# Patient Record
Sex: FEMALE | Race: OTHER | Employment: FULL TIME | URBAN - METROPOLITAN AREA
[De-identification: names, ages, dates, MRNs, and addresses within clinical notes are randomized per-mention and may not be internally consistent; named-entity substitution may affect disease eponyms.]

---

## 2017-04-04 ENCOUNTER — OFFICE VISIT (OUTPATIENT)
Dept: FAMILY MEDICINE CLINIC | Age: 42
End: 2017-04-04

## 2017-04-04 VITALS
HEART RATE: 84 BPM | BODY MASS INDEX: 30.15 KG/M2 | HEIGHT: 64 IN | RESPIRATION RATE: 16 BRPM | WEIGHT: 176.6 LBS | TEMPERATURE: 97.9 F | OXYGEN SATURATION: 99 % | DIASTOLIC BLOOD PRESSURE: 80 MMHG | SYSTOLIC BLOOD PRESSURE: 124 MMHG

## 2017-04-04 DIAGNOSIS — L23.5 ALLERGIC DERMATITIS DUE TO OTHER CHEMICAL PRODUCT: Primary | ICD-10-CM

## 2017-04-04 PROCEDURE — 99213 OFFICE O/P EST LOW 20 MIN: CPT | Performed by: NURSE PRACTITIONER

## 2017-04-04 RX ORDER — METHYLPREDNISOLONE 4 MG/1
TABLET ORAL
Qty: 1 KIT | Refills: 0 | Status: SHIPPED | OUTPATIENT
Start: 2017-04-04 | End: 2017-04-10

## 2017-04-04 ASSESSMENT — ENCOUNTER SYMPTOMS
FACIAL SWELLING: 0
COUGH: 0
WHEEZING: 0
RHINORRHEA: 0
SHORTNESS OF BREATH: 0
SORE THROAT: 0
VOICE CHANGE: 0
ABDOMINAL DISTENTION: 0
NAUSEA: 0
TROUBLE SWALLOWING: 0
VOMITING: 0

## 2017-07-14 RX ORDER — NORGESTIMATE AND ETHINYL ESTRADIOL 7DAYSX3 28
KIT ORAL
Qty: 84 TABLET | Refills: 1 | Status: SHIPPED | OUTPATIENT
Start: 2017-07-14 | End: 2017-12-19 | Stop reason: SDUPTHER

## 2017-09-13 ENCOUNTER — OFFICE VISIT (OUTPATIENT)
Dept: FAMILY MEDICINE CLINIC | Age: 42
End: 2017-09-13

## 2017-09-13 VITALS
HEART RATE: 96 BPM | HEIGHT: 64 IN | SYSTOLIC BLOOD PRESSURE: 136 MMHG | TEMPERATURE: 98.1 F | WEIGHT: 186 LBS | OXYGEN SATURATION: 99 % | DIASTOLIC BLOOD PRESSURE: 86 MMHG | BODY MASS INDEX: 31.76 KG/M2

## 2017-09-13 DIAGNOSIS — R35.0 FREQUENCY OF URINATION: ICD-10-CM

## 2017-09-13 DIAGNOSIS — R35.0 FREQUENCY OF URINATION: Primary | ICD-10-CM

## 2017-09-13 LAB
BILIRUBIN, POC: NORMAL
BLOOD URINE, POC: NORMAL
CLARITY, POC: CLEAR
COLOR, POC: YELLOW
GLUCOSE URINE, POC: NORMAL
KETONES, POC: NORMAL
LEUKOCYTE EST, POC: NORMAL
NITRITE, POC: NORMAL
PH, POC: 6
PROTEIN, POC: NORMAL
SPECIFIC GRAVITY, POC: 1030
UROBILINOGEN, POC: 3.5

## 2017-09-13 PROCEDURE — 81003 URINALYSIS AUTO W/O SCOPE: CPT | Performed by: NURSE PRACTITIONER

## 2017-09-13 PROCEDURE — 99212 OFFICE O/P EST SF 10 MIN: CPT | Performed by: NURSE PRACTITIONER

## 2017-09-13 ASSESSMENT — PATIENT HEALTH QUESTIONNAIRE - PHQ9
SUM OF ALL RESPONSES TO PHQ9 QUESTIONS 1 & 2: 0
2. FEELING DOWN, DEPRESSED OR HOPELESS: 0
1. LITTLE INTEREST OR PLEASURE IN DOING THINGS: 0
SUM OF ALL RESPONSES TO PHQ QUESTIONS 1-9: 0

## 2017-09-15 LAB
ORGANISM: ABNORMAL
URINE CULTURE, ROUTINE: ABNORMAL

## 2017-09-18 RX ORDER — FLUCONAZOLE 150 MG/1
150 TABLET ORAL ONCE
Qty: 1 TABLET | Refills: 0 | Status: SHIPPED | OUTPATIENT
Start: 2017-09-18 | End: 2017-09-18

## 2017-09-18 RX ORDER — CIPROFLOXACIN 500 MG/1
500 TABLET, FILM COATED ORAL 2 TIMES DAILY
Qty: 10 TABLET | Refills: 0 | Status: SHIPPED | OUTPATIENT
Start: 2017-09-18 | End: 2017-09-23

## 2017-10-24 DIAGNOSIS — Z12.4 PAP SMEAR FOR CERVICAL CANCER SCREENING: ICD-10-CM

## 2017-10-24 DIAGNOSIS — Z00.00 WELLNESS EXAMINATION: ICD-10-CM

## 2017-10-24 DIAGNOSIS — Z11.3 ROUTINE SCREENING FOR STI (SEXUALLY TRANSMITTED INFECTION): ICD-10-CM

## 2017-10-24 PROBLEM — B00.9 HSV (HERPES SIMPLEX VIRUS) INFECTION: Status: ACTIVE | Noted: 2017-10-24

## 2017-10-24 LAB
BILIRUBIN URINE: NEGATIVE
BLOOD, URINE: NEGATIVE
CLARITY: CLEAR
COLOR: YELLOW
GLUCOSE URINE: NEGATIVE MG/DL
KETONES, URINE: NEGATIVE MG/DL
LEUKOCYTE ESTERASE, URINE: NEGATIVE
NITRITE, URINE: NEGATIVE
PH UA: 7 (ref 5–9)
PROTEIN UA: NEGATIVE MG/DL
SPECIFIC GRAVITY UA: 1.02 (ref 1–1.03)
UROBILINOGEN, URINE: 0.2 E.U./DL

## 2017-10-27 ENCOUNTER — TELEPHONE (OUTPATIENT)
Dept: FAMILY MEDICINE CLINIC | Age: 42
End: 2017-10-27

## 2017-10-27 ENCOUNTER — OFFICE VISIT (OUTPATIENT)
Dept: FAMILY MEDICINE CLINIC | Age: 42
End: 2017-10-27

## 2017-10-27 VITALS
SYSTOLIC BLOOD PRESSURE: 122 MMHG | TEMPERATURE: 98.5 F | HEART RATE: 82 BPM | WEIGHT: 177 LBS | RESPIRATION RATE: 14 BRPM | DIASTOLIC BLOOD PRESSURE: 80 MMHG | BODY MASS INDEX: 30.38 KG/M2

## 2017-10-27 DIAGNOSIS — E53.8 B12 DEFICIENCY: ICD-10-CM

## 2017-10-27 DIAGNOSIS — Z13.1 SCREENING FOR DIABETES MELLITUS: ICD-10-CM

## 2017-10-27 DIAGNOSIS — Z00.00 WELLNESS EXAMINATION: ICD-10-CM

## 2017-10-27 DIAGNOSIS — E66.09 OBESITY DUE TO EXCESS CALORIES, UNSPECIFIED CLASSIFICATION, UNSPECIFIED WHETHER SERIOUS COMORBIDITY PRESENT: Primary | ICD-10-CM

## 2017-10-27 DIAGNOSIS — E55.9 VITAMIN D DEFICIENCY: Primary | ICD-10-CM

## 2017-10-27 DIAGNOSIS — E87.5 HYPERKALEMIA: ICD-10-CM

## 2017-10-27 DIAGNOSIS — Z13.29 SCREENING FOR THYROID DISORDER: ICD-10-CM

## 2017-10-27 DIAGNOSIS — Z13.220 SCREENING, LIPID: ICD-10-CM

## 2017-10-27 LAB
ALBUMIN SERPL-MCNC: 4 G/DL (ref 3.9–4.9)
ALP BLD-CCNC: 48 U/L (ref 40–130)
ALT SERPL-CCNC: 17 U/L (ref 0–33)
ANION GAP SERPL CALCULATED.3IONS-SCNC: 14 MEQ/L (ref 7–13)
AST SERPL-CCNC: 19 U/L (ref 0–35)
BILIRUB SERPL-MCNC: 0.3 MG/DL (ref 0–1.2)
BUN BLDV-MCNC: 21 MG/DL (ref 6–20)
C. TRACHOMATIS DNA ,URINE: NEGATIVE
CALCIUM SERPL-MCNC: 9.2 MG/DL (ref 8.6–10.2)
CHLORIDE BLD-SCNC: 104 MEQ/L (ref 98–107)
CHOLESTEROL, TOTAL: 187 MG/DL (ref 0–199)
CO2: 22 MEQ/L (ref 22–29)
CREAT SERPL-MCNC: 0.73 MG/DL (ref 0.5–0.9)
GENITAL CULTURE, ROUTINE: NORMAL
GFR AFRICAN AMERICAN: >60
GFR NON-AFRICAN AMERICAN: >60
GLOBULIN: 3.2 G/DL (ref 2.3–3.5)
GLUCOSE BLD-MCNC: 81 MG/DL (ref 74–109)
HBA1C MFR BLD: 5.5 % (ref 4.8–5.9)
HCT VFR BLD CALC: 35.5 % (ref 37–47)
HDLC SERPL-MCNC: 86 MG/DL (ref 40–59)
HEMOGLOBIN: 10.8 G/DL (ref 12–16)
LDL CHOLESTEROL CALCULATED: 86 MG/DL (ref 0–129)
MCH RBC QN AUTO: 21.4 PG (ref 27–31.3)
MCHC RBC AUTO-ENTMCNC: 30.4 % (ref 33–37)
MCV RBC AUTO: 70.3 FL (ref 82–100)
N. GONORRHOEAE DNA, URINE: NEGATIVE
PDW BLD-RTO: 18.7 % (ref 11.5–14.5)
PLATELET # BLD: 287 K/UL (ref 130–400)
POTASSIUM SERPL-SCNC: 5.3 MEQ/L (ref 3.5–5.1)
RBC # BLD: 5.05 M/UL (ref 4.2–5.4)
SODIUM BLD-SCNC: 140 MEQ/L (ref 132–144)
SPECIMEN SOURCE: NORMAL
T. VAGINALIS AMPLIFIED: NEGATIVE
T4 TOTAL: 7.1 UG/DL (ref 4.5–11.7)
TOTAL PROTEIN: 7.2 G/DL (ref 6.4–8.1)
TRIGL SERPL-MCNC: 73 MG/DL (ref 0–200)
TSH REFLEX: 2.72 UIU/ML (ref 0.27–4.2)
VITAMIN B-12: 912 PG/ML (ref 211–946)
VITAMIN D 25-HYDROXY: 19.9 NG/ML (ref 30–100)
WBC # BLD: 5.3 K/UL (ref 4.8–10.8)

## 2017-10-27 PROCEDURE — 99213 OFFICE O/P EST LOW 20 MIN: CPT | Performed by: INTERNAL MEDICINE

## 2017-10-27 RX ORDER — PHENTERMINE HYDROCHLORIDE 37.5 MG/1
37.5 TABLET ORAL
Qty: 60 TABLET | Refills: 0 | Status: SHIPPED | OUTPATIENT
Start: 2017-10-27 | End: 2017-10-27 | Stop reason: SDUPTHER

## 2017-10-27 RX ORDER — PHENTERMINE HYDROCHLORIDE 37.5 MG/1
37.5 TABLET ORAL
Qty: 60 TABLET | Refills: 0 | Status: SHIPPED | OUTPATIENT
Start: 2017-10-27 | End: 2017-10-27 | Stop reason: DRUGHIGH

## 2017-10-27 RX ORDER — ERGOCALCIFEROL 1.25 MG/1
50000 CAPSULE ORAL WEEKLY
Qty: 8 CAPSULE | Refills: 0 | Status: SHIPPED | OUTPATIENT
Start: 2017-10-27 | End: 2017-11-28 | Stop reason: SDUPTHER

## 2017-10-27 RX ORDER — PHENTERMINE HYDROCHLORIDE 37.5 MG/1
37.5 TABLET ORAL
Qty: 30 TABLET | Refills: 0 | Status: SHIPPED
Start: 2017-10-27 | End: 2017-11-28 | Stop reason: SDUPTHER

## 2017-10-27 ASSESSMENT — ENCOUNTER SYMPTOMS
EYE DISCHARGE: 0
CONSTIPATION: 0
WHEEZING: 0
EYE PAIN: 0
SORE THROAT: 0
BACK PAIN: 0
EYE ITCHING: 0
COLOR CHANGE: 0
COUGH: 0
TROUBLE SWALLOWING: 0
EYE REDNESS: 0
VOICE CHANGE: 0
NAUSEA: 0
BLOOD IN STOOL: 0
ABDOMINAL DISTENTION: 0
DIARRHEA: 0
SHORTNESS OF BREATH: 0
PHOTOPHOBIA: 0
ABDOMINAL PAIN: 0

## 2017-10-27 NOTE — PROGRESS NOTES
Subjective:      Patient ID: Clark Garcia is a 38 y.o. female    HPI    Presents for weight loss counseling. Previous attempts to lose weight have included exercise, dietary modification and use of Adipex. Since 2011, she has lost 38lbs, however, patient attests to more weight loss than documented in our system. Last use of Adipex was 'many years ago\"-not sure exactly how many. She has remained steadfast with diet and exercise but weight is currently at a plateau. As such she requests another pharmacological adjuvant to current weight loss methods. No history of tobacco smoking, no chest pain or SOB, no fever or chills, no cough, nausea, vomiting. No headache, dizziness or lightheadedness. 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 No pertinent surgical history. Social History     Social History    Marital status:      Spouse name: N/A    Number of children: N/A    Years of education: N/A     Occupational History    Not on file. Social History Main Topics    Smoking status: Never Smoker    Smokeless tobacco: Never Used    Alcohol use 0.0 oz/week    Drug use: No    Sexual activity: Not on file     Other Topics Concern    Not on file     Social History Narrative    No narrative on file     History reviewed. No pertinent family history. Allergies:  Formaldehyde; Mushroom extract complex;  Shellfish-derived products; and Vicodin [hydrocodone-acetaminophen]  Patient Active Problem List   Diagnosis    Asthma    Anemia    Migraines    B12 deficiency    BMI 30.0-30.9,adult    HSV (herpes simplex virus) infection     Current Outpatient Prescriptions on File Prior to Visit   Medication Sig Dispense Refill    valACYclovir (VALTREX) 500 MG tablet Take 1 tablet by mouth 2 times daily for 5 days 90 tablet 1    Norgestim-Eth Estrad Triphasic (TRI-PREVIFEM) 0.18/0.215/0.25 MG-35 MCG TABS Take 1 tablet by mouth daily 84 tablet 1    mometasone (ELOCON) 0.1 % cream Apply topically daily. 15 g 1    albuterol (PROVENTIL HFA;VENTOLIN HFA) 108 (90 BASE) MCG/ACT inhaler Inhale 2 puffs into the lungs every 4 hours as needed. 1 Inhaler 3    SUMAtriptan (IMITREX) 50 MG tablet Take 1 tablet by mouth once as needed for Migraine 9 tablet 3     No current facility-administered medications on file prior to visit. Review of Systems   Constitutional: Negative for activity change, appetite change, chills, diaphoresis, fatigue, fever and unexpected weight change. HENT: Negative for congestion, dental problem, drooling, ear discharge, ear pain, hearing loss, mouth sores, sore throat, trouble swallowing and voice change. Eyes: Negative for photophobia, pain, discharge, redness and itching. Respiratory: Negative for cough, shortness of breath and wheezing. Gastrointestinal: Negative for abdominal distention, abdominal pain, blood in stool, constipation, diarrhea and nausea. Endocrine: Negative for cold intolerance, heat intolerance, polydipsia and polyuria. Genitourinary: Negative for decreased urine volume, difficulty urinating, dysuria, flank pain, frequency, hematuria and urgency. Musculoskeletal: Negative for arthralgias, back pain and joint swelling. Skin: Negative for color change. Allergic/Immunologic: Negative for environmental allergies and food allergies. Neurological: Negative for dizziness, seizures, syncope, weakness, light-headedness, numbness and headaches. Psychiatric/Behavioral: Negative for agitation, decreased concentration, dysphoric mood and hallucinations. The patient is not nervous/anxious.         Objective:   /80 (Site: Left Arm, Position: Sitting, Cuff Size: Medium Adult)   Pulse 82   Temp 98.5 °F (36.9 °C) (Temporal)   Resp 14   Wt 177 lb (80.3 kg)   LMP 10/19/2017 (Exact Date)   BMI 30.38 kg/m²     Physical Exam   Constitutional: She is oriented to person,

## 2017-11-28 ENCOUNTER — OFFICE VISIT (OUTPATIENT)
Dept: FAMILY MEDICINE CLINIC | Age: 42
End: 2017-11-28

## 2017-11-28 VITALS
BODY MASS INDEX: 29.02 KG/M2 | DIASTOLIC BLOOD PRESSURE: 76 MMHG | TEMPERATURE: 98.1 F | RESPIRATION RATE: 16 BRPM | WEIGHT: 170 LBS | SYSTOLIC BLOOD PRESSURE: 118 MMHG | HEART RATE: 74 BPM | HEIGHT: 64 IN

## 2017-11-28 DIAGNOSIS — E66.09 OBESITY DUE TO EXCESS CALORIES, UNSPECIFIED CLASSIFICATION, UNSPECIFIED WHETHER SERIOUS COMORBIDITY PRESENT: ICD-10-CM

## 2017-11-28 DIAGNOSIS — E55.9 VITAMIN D DEFICIENCY: ICD-10-CM

## 2017-11-28 PROCEDURE — 99213 OFFICE O/P EST LOW 20 MIN: CPT | Performed by: INTERNAL MEDICINE

## 2017-11-28 RX ORDER — ERGOCALCIFEROL 1.25 MG/1
50000 CAPSULE ORAL WEEKLY
Qty: 4 CAPSULE | Refills: 0 | Status: SHIPPED | OUTPATIENT
Start: 2017-11-28 | End: 2017-12-20 | Stop reason: DRUGHIGH

## 2017-11-28 RX ORDER — PHENTERMINE HYDROCHLORIDE 37.5 MG/1
37.5 TABLET ORAL
Qty: 30 TABLET | Refills: 0 | Status: SHIPPED | OUTPATIENT
Start: 2017-11-28 | End: 2017-12-19 | Stop reason: SDUPTHER

## 2017-11-28 ASSESSMENT — ENCOUNTER SYMPTOMS
VOICE CHANGE: 0
EYE ITCHING: 0
DIARRHEA: 0
EYE REDNESS: 0
ABDOMINAL DISTENTION: 0
EYE DISCHARGE: 0
EYE PAIN: 0
TROUBLE SWALLOWING: 0
NAUSEA: 0
COLOR CHANGE: 0
SHORTNESS OF BREATH: 0
COUGH: 0
PHOTOPHOBIA: 0
CONSTIPATION: 0
BLOOD IN STOOL: 0
ABDOMINAL PAIN: 0
WHEEZING: 0
BACK PAIN: 0
SORE THROAT: 0

## 2017-11-28 NOTE — PROGRESS NOTES
Subjective:      Patient ID: Jailyn Lozoya is a 38 y.o. female    HPI     Presents for refill of Adipex weight loss medication. Filled 1 month ago and patient has lost 7 pounds since then. Denies chest pain or SOB, no fever or chills. Was also started on ergocalcoferol 1 month ago for Viit. D deficiency. Has had only 4 tabs. Needs more before rechecking Vit D. 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 No pertinent surgical history. Social History     Social History    Marital status:      Spouse name: N/A    Number of children: N/A    Years of education: N/A     Occupational History    Not on file. Social History Main Topics    Smoking status: Never Smoker    Smokeless tobacco: Never Used    Alcohol use 0.0 oz/week    Drug use: No    Sexual activity: Not on file     Other Topics Concern    Not on file     Social History Narrative    No narrative on file     History reviewed. No pertinent family history. Allergies:  Formaldehyde; Mushroom extract complex; Shellfish-derived products; and Vicodin [hydrocodone-acetaminophen]  Patient Active Problem List   Diagnosis    Asthma    Anemia    Migraines    B12 deficiency    BMI 30.0-30.9,adult    HSV (herpes simplex virus) infection     Current Outpatient Prescriptions on File Prior to Visit   Medication Sig Dispense Refill    vitamin D (CHOLECALCIFEROL) 1000 UNIT TABS tablet Take 1 tablet by mouth daily Start after 8 weeks of Vit D 50,000 units 90 tablet 1    Norgestim-Eth Estrad Triphasic (TRI-PREVIFEM) 0.18/0.215/0.25 MG-35 MCG TABS Take 1 tablet by mouth daily 84 tablet 1    mometasone (ELOCON) 0.1 % cream Apply topically daily. 15 g 1    albuterol (PROVENTIL HFA;VENTOLIN HFA) 108 (90 BASE) MCG/ACT inhaler Inhale 2 puffs into the lungs every 4 hours as needed.  1 Inhaler 3    SUMAtriptan (IMITREX) 50 MG tablet Take 1 tablet by mouth once as needed for Migraine 9 tablet 3     No current facility-administered medications on file prior to visit. Review of Systems   Constitutional: Negative for activity change, appetite change, chills, diaphoresis, fatigue, fever and unexpected weight change. HENT: Negative for congestion, dental problem, drooling, ear discharge, ear pain, hearing loss, mouth sores, sore throat, trouble swallowing and voice change. Eyes: Negative for photophobia, pain, discharge, redness and itching. Respiratory: Negative for cough, shortness of breath and wheezing. Gastrointestinal: Negative for abdominal distention, abdominal pain, blood in stool, constipation, diarrhea and nausea. Endocrine: Negative for cold intolerance, heat intolerance, polydipsia and polyuria. Genitourinary: Negative for decreased urine volume, difficulty urinating, dysuria, flank pain, frequency, hematuria and urgency. Musculoskeletal: Negative for arthralgias, back pain and joint swelling. Skin: Negative for color change. Allergic/Immunologic: Negative for environmental allergies and food allergies. Neurological: Negative for dizziness, seizures, syncope, weakness, light-headedness, numbness and headaches. Psychiatric/Behavioral: Negative for agitation, decreased concentration, dysphoric mood and hallucinations. The patient is not nervous/anxious. Objective:   /76   Pulse 74   Temp 98.1 °F (36.7 °C) (Temporal)   Resp 16   Ht 5' 4\" (1.626 m)   Wt 170 lb (77.1 kg)   BMI 29.18 kg/m²     Physical Exam   Constitutional: She is oriented to person, place, and time. She appears well-developed and well-nourished. No distress. HENT:   Head: Normocephalic. Cardiovascular: Normal rate, regular rhythm, S1 normal, S2 normal and normal heart sounds. Pulmonary/Chest: Effort normal and breath sounds normal. No accessory muscle usage. No tachypnea. No respiratory distress. She has no wheezes. Abdominal: Soft.  Bowel sounds are normal. There is no tenderness. Musculoskeletal: She exhibits no edema. Neurological: She is alert and oriented to person, place, and time. Skin: She is not diaphoretic. Assessment:      1. Obesity due to excess calories, unspecified classification, unspecified whether serious comorbidity present  phentermine (ADIPEX-P) 37.5 MG tablet   2. Vitamin D deficiency  vitamin D (ERGOCALCIFEROL) 54140 units CAPS capsule         Plan:      No orders of the defined types were placed in this encounter. Orders Placed This Encounter   Medications    phentermine (ADIPEX-P) 37.5 MG tablet     Sig: Take 1 tablet by mouth every morning (before breakfast) . Dispense:  30 tablet     Refill:  0    vitamin D (ERGOCALCIFEROL) 25020 units CAPS capsule     Sig: Take 1 capsule by mouth once a week Take one capsule once every Monday for 8 weeks. Dispense:  4 capsule     Refill:  0       Return in about 1 month (around 12/28/2017).

## 2017-12-19 ENCOUNTER — OFFICE VISIT (OUTPATIENT)
Dept: FAMILY MEDICINE CLINIC | Age: 42
End: 2017-12-19

## 2017-12-19 VITALS
TEMPERATURE: 97.6 F | HEART RATE: 76 BPM | BODY MASS INDEX: 28.24 KG/M2 | DIASTOLIC BLOOD PRESSURE: 86 MMHG | RESPIRATION RATE: 16 BRPM | WEIGHT: 165.38 LBS | HEIGHT: 64 IN | SYSTOLIC BLOOD PRESSURE: 122 MMHG

## 2017-12-19 DIAGNOSIS — B35.1 ONYCHOMYCOSIS: ICD-10-CM

## 2017-12-19 DIAGNOSIS — D64.9 ANEMIA, UNSPECIFIED TYPE: ICD-10-CM

## 2017-12-19 DIAGNOSIS — K59.03 DRUG-INDUCED CONSTIPATION: ICD-10-CM

## 2017-12-19 DIAGNOSIS — D50.8 OTHER IRON DEFICIENCY ANEMIA: ICD-10-CM

## 2017-12-19 DIAGNOSIS — E55.9 VITAMIN D DEFICIENCY: ICD-10-CM

## 2017-12-19 DIAGNOSIS — Z30.9 ENCOUNTER FOR CONTRACEPTIVE MANAGEMENT, UNSPECIFIED TYPE: ICD-10-CM

## 2017-12-19 DIAGNOSIS — E66.3 OVERWEIGHT (BMI 25.0-29.9): Primary | ICD-10-CM

## 2017-12-19 LAB
ANISOCYTOSIS: ABNORMAL
BASOPHILS ABSOLUTE: 0.1 K/UL (ref 0–0.2)
BASOPHILS RELATIVE PERCENT: 1.5 %
BURR CELLS: ABNORMAL
EOSINOPHILS ABSOLUTE: 0.2 K/UL (ref 0–0.7)
EOSINOPHILS RELATIVE PERCENT: 4.8 %
FERRITIN: 8.3 NG/ML (ref 13–150)
HCT VFR BLD CALC: 35.7 % (ref 37–47)
HEMOGLOBIN: 10.9 G/DL (ref 12–16)
HYPOCHROMIA: ABNORMAL
IRON SATURATION: 11 % (ref 11–46)
IRON: 47 UG/DL (ref 37–145)
LYMPHOCYTES ABSOLUTE: 1.4 K/UL (ref 1–4.8)
LYMPHOCYTES RELATIVE PERCENT: 32 %
MCH RBC QN AUTO: 21.9 PG (ref 27–31.3)
MCHC RBC AUTO-ENTMCNC: 30.4 % (ref 33–37)
MCV RBC AUTO: 72.1 FL (ref 82–100)
MICROCYTES: ABNORMAL
MONOCYTES ABSOLUTE: 0.5 K/UL (ref 0.2–0.8)
MONOCYTES RELATIVE PERCENT: 10.8 %
NEUTROPHILS ABSOLUTE: 2.2 K/UL (ref 1.4–6.5)
NEUTROPHILS RELATIVE PERCENT: 50.9 %
OVALOCYTES: ABNORMAL
PDW BLD-RTO: 19.3 % (ref 11.5–14.5)
PLATELET # BLD: 254 K/UL (ref 130–400)
POIKILOCYTES: ABNORMAL
RBC # BLD: 4.95 M/UL (ref 4.2–5.4)
TOTAL IRON BINDING CAPACITY: 436 UG/DL (ref 178–450)
VITAMIN B-12: 835 PG/ML (ref 211–946)
VITAMIN D 25-HYDROXY: 34.2 NG/ML (ref 30–100)
WBC # BLD: 4.3 K/UL (ref 4.8–10.8)

## 2017-12-19 PROCEDURE — 99214 OFFICE O/P EST MOD 30 MIN: CPT | Performed by: INTERNAL MEDICINE

## 2017-12-19 RX ORDER — SENNA PLUS 8.6 MG/1
1 TABLET ORAL 2 TIMES DAILY
Qty: 90 TABLET | Refills: 2 | Status: SHIPPED | OUTPATIENT
Start: 2017-12-19 | End: 2018-12-19

## 2017-12-19 RX ORDER — PHENTERMINE HYDROCHLORIDE 37.5 MG/1
37.5 TABLET ORAL
Qty: 30 TABLET | Refills: 0 | Status: SHIPPED | OUTPATIENT
Start: 2017-12-19 | End: 2018-01-18

## 2017-12-19 RX ORDER — VALACYCLOVIR HYDROCHLORIDE 500 MG/1
TABLET, FILM COATED ORAL
Refills: 1 | COMMUNITY
Start: 2017-12-06 | End: 2018-02-01 | Stop reason: SDUPTHER

## 2017-12-19 RX ORDER — NORGESTIMATE AND ETHINYL ESTRADIOL 7DAYSX3 28
KIT ORAL
Qty: 90 TABLET | Refills: 3 | Status: SHIPPED | OUTPATIENT
Start: 2017-12-19 | End: 2018-11-29 | Stop reason: SDUPTHER

## 2017-12-19 RX ORDER — PRENATAL VIT 91/IRON/FOLIC/DHA 28-975-200
COMBINATION PACKAGE (EA) ORAL
Qty: 24 G | Refills: 1 | Status: SHIPPED | OUTPATIENT
Start: 2017-12-19 | End: 2021-06-16 | Stop reason: ALTCHOICE

## 2017-12-19 ASSESSMENT — ENCOUNTER SYMPTOMS
CONSTIPATION: 0
EYE ITCHING: 0
EYE REDNESS: 0
SORE THROAT: 0
PHOTOPHOBIA: 0
BLOOD IN STOOL: 0
ABDOMINAL DISTENTION: 0
VOICE CHANGE: 0
TROUBLE SWALLOWING: 0
ABDOMINAL PAIN: 0
EYE DISCHARGE: 0
BACK PAIN: 0
WHEEZING: 0
EYE PAIN: 0
NAUSEA: 0
COLOR CHANGE: 0
DIARRHEA: 0
COUGH: 0
SHORTNESS OF BREATH: 0

## 2017-12-19 NOTE — PROGRESS NOTES
normal and breath sounds normal. No accessory muscle usage. No tachypnea. No respiratory distress. She has no wheezes. She has no rales. Abdominal: Soft. Bowel sounds are normal. There is no tenderness. Musculoskeletal:        Feet:    B/L foot: DP and PT pulses palpable    Right great toenail hyperpigmented and raised edges-medial and lateral  NO erythema or swelling, no TTP   Skin: She is not diaphoretic. Assessment:      1. Overweight (BMI 25.0-29.9)  phentermine (ADIPEX-P) 37.5 MG tablet   2. Vitamin D deficiency  Vitamin D 25 Hydroxy   3. Other iron deficiency anemia  CBC Auto Differential    Iron And Tibc    Ferritin    senna (SENOKOT) 8.6 MG tablet   4. Onychomycosis  terbinafine (LAMISIL) 1 % cream   5. Encounter for contraceptive management, unspecified type  Norgestim-Eth Estrad Triphasic (TRI-PREVIFEM) 0.18/0.215/0.25 MG-35 MCG TABS   6. Anemia, unspecified type  Vitamin B12   7. Drug-induced constipation  senna (SENOKOT) 8.6 MG tablet         Plan:      Orders Placed This Encounter   Procedures    Vitamin D 25 Hydroxy     Standing Status:   Future     Number of Occurrences:   1     Standing Expiration Date:   12/19/2018    Vitamin B12     Standing Status:   Future     Number of Occurrences:   1     Standing Expiration Date:   12/19/2018    CBC Auto Differential     Standing Status:   Future     Number of Occurrences:   1     Standing Expiration Date:   12/19/2018    Iron And Tibc     Standing Status:   Future     Number of Occurrences:   1     Standing Expiration Date:   12/19/2018     Order Specific Question:   Is Patient Fasting? Answer:   yes     Order Specific Question:   No of Hours? Answer:   overnight    Ferritin     Standing Status:   Future     Number of Occurrences:   1     Standing Expiration Date:   12/19/2018     Orders Placed This Encounter   Medications    phentermine (ADIPEX-P) 37.5 MG tablet     Sig: Take 1 tablet by mouth every morning (before breakfast) . Dispense:  30 tablet     Refill:  0    Norgestim-Eth Estrad Triphasic (TRI-PREVIFEM) 0.18/0.215/0.25 MG-35 MCG TABS     Sig: Take 1 tablet by mouth daily     Dispense:  90 tablet     Refill:  3    terbinafine (LAMISIL) 1 % cream     Sig: Apply topically 2 times daily. Dispense:  24 g     Refill:  1    senna (SENOKOT) 8.6 MG tablet     Sig: Take 1 tablet by mouth 2 times daily     Dispense:  90 tablet     Refill:  2       Return in about 4 weeks (around 1/16/2018), or Adipex refill.

## 2017-12-20 DIAGNOSIS — E55.9 VITAMIN D INSUFFICIENCY: Primary | ICD-10-CM

## 2017-12-20 RX ORDER — ERGOCALCIFEROL (VITAMIN D2) 10 MCG
600 TABLET ORAL DAILY
Qty: 90 TABLET | Refills: 1 | Status: SHIPPED | OUTPATIENT
Start: 2017-12-20 | End: 2021-06-16 | Stop reason: ALTCHOICE

## 2017-12-23 ENCOUNTER — TELEPHONE (OUTPATIENT)
Dept: FAMILY MEDICINE CLINIC | Age: 42
End: 2017-12-23

## 2017-12-26 RX ORDER — NORGESTIMATE AND ETHINYL ESTRADIOL 7DAYSX3 28
KIT ORAL
Qty: 84 TABLET | Refills: 1 | OUTPATIENT
Start: 2017-12-26

## 2018-02-01 ENCOUNTER — OFFICE VISIT (OUTPATIENT)
Dept: FAMILY MEDICINE CLINIC | Age: 43
End: 2018-02-01
Payer: COMMERCIAL

## 2018-02-01 VITALS
DIASTOLIC BLOOD PRESSURE: 82 MMHG | WEIGHT: 162.2 LBS | SYSTOLIC BLOOD PRESSURE: 122 MMHG | BODY MASS INDEX: 27.84 KG/M2 | RESPIRATION RATE: 12 BRPM | HEART RATE: 96 BPM | TEMPERATURE: 96.2 F

## 2018-02-01 DIAGNOSIS — N89.8 VAGINAL IRRITATION: ICD-10-CM

## 2018-02-01 DIAGNOSIS — N30.00 ACUTE CYSTITIS WITHOUT HEMATURIA: Primary | ICD-10-CM

## 2018-02-01 DIAGNOSIS — N30.00 ACUTE CYSTITIS WITHOUT HEMATURIA: ICD-10-CM

## 2018-02-01 DIAGNOSIS — R35.0 FREQUENCY OF URINATION: ICD-10-CM

## 2018-02-01 LAB
CLUE CELLS: NORMAL
TRICHOMONAS PREP: NORMAL
TRICHOMONAS VAGINALIS SCREEN: NEGATIVE
YEAST WET PREP: NORMAL

## 2018-02-01 PROCEDURE — 99213 OFFICE O/P EST LOW 20 MIN: CPT | Performed by: NURSE PRACTITIONER

## 2018-02-01 RX ORDER — VALACYCLOVIR HYDROCHLORIDE 500 MG/1
TABLET, FILM COATED ORAL
Qty: 10 TABLET | Refills: 1 | Status: SHIPPED | OUTPATIENT
Start: 2018-02-01 | End: 2019-02-11 | Stop reason: SDUPTHER

## 2018-02-01 RX ORDER — MOMETASONE FUROATE 1 MG/G
CREAM TOPICAL
Qty: 15 G | Refills: 1 | Status: SHIPPED | OUTPATIENT
Start: 2018-02-01 | End: 2021-07-14 | Stop reason: SDUPTHER

## 2018-02-01 RX ORDER — NITROFURANTOIN 25; 75 MG/1; MG/1
100 CAPSULE ORAL 2 TIMES DAILY
Qty: 10 CAPSULE | Refills: 0 | Status: SHIPPED | OUTPATIENT
Start: 2018-02-01 | End: 2018-02-06

## 2018-02-01 RX ORDER — ALBUTEROL SULFATE 90 UG/1
2 AEROSOL, METERED RESPIRATORY (INHALATION) EVERY 4 HOURS PRN
Qty: 1 INHALER | Refills: 1 | Status: SHIPPED | OUTPATIENT
Start: 2018-02-01 | End: 2021-12-29 | Stop reason: SDUPTHER

## 2018-02-01 ASSESSMENT — ENCOUNTER SYMPTOMS
GASTROINTESTINAL NEGATIVE: 1
RESPIRATORY NEGATIVE: 1

## 2018-02-02 ENCOUNTER — TELEPHONE (OUTPATIENT)
Dept: FAMILY MEDICINE CLINIC | Age: 43
End: 2018-02-02

## 2018-02-02 NOTE — TELEPHONE ENCOUNTER
Spoke with patient per our face to face conversation and patient states she will wait the weekend and see what happens.  If it doesn't change she will consider the referral to GYN

## 2018-02-03 ENCOUNTER — OFFICE VISIT (OUTPATIENT)
Dept: FAMILY MEDICINE CLINIC | Age: 43
End: 2018-02-03
Payer: COMMERCIAL

## 2018-02-03 VITALS
BODY MASS INDEX: 28 KG/M2 | DIASTOLIC BLOOD PRESSURE: 78 MMHG | SYSTOLIC BLOOD PRESSURE: 122 MMHG | HEIGHT: 64 IN | HEART RATE: 89 BPM | TEMPERATURE: 97.9 F | OXYGEN SATURATION: 99 % | WEIGHT: 164 LBS

## 2018-02-03 DIAGNOSIS — B37.9 ANTIBIOTIC-INDUCED YEAST INFECTION: Primary | ICD-10-CM

## 2018-02-03 DIAGNOSIS — T36.95XA ANTIBIOTIC-INDUCED YEAST INFECTION: Primary | ICD-10-CM

## 2018-02-03 DIAGNOSIS — N30.00 ACUTE CYSTITIS WITHOUT HEMATURIA: ICD-10-CM

## 2018-02-03 LAB
BILIRUBIN, POC: ABNORMAL
BLOOD URINE, POC: NEGATIVE
CLARITY, POC: ABNORMAL
COLOR, POC: ABNORMAL
GENITAL CULTURE, ROUTINE: NORMAL
GLUCOSE URINE, POC: ABNORMAL
KETONES, POC: POSITIVE
LEUKOCYTE EST, POC: POSITIVE
NITRITE, POC: NEGATIVE
ORGANISM: ABNORMAL
ORGANISM: ABNORMAL
PH, POC: 6
PROTEIN, POC: ABNORMAL
SPECIFIC GRAVITY, POC: 1.03
URINE CULTURE, ROUTINE: ABNORMAL
UROBILINOGEN, POC: POSITIVE

## 2018-02-03 PROCEDURE — 81003 URINALYSIS AUTO W/O SCOPE: CPT | Performed by: NURSE PRACTITIONER

## 2018-02-03 PROCEDURE — 99213 OFFICE O/P EST LOW 20 MIN: CPT | Performed by: NURSE PRACTITIONER

## 2018-02-03 RX ORDER — FLUCONAZOLE 150 MG/1
150 TABLET ORAL ONCE
Qty: 2 TABLET | Refills: 0 | Status: SHIPPED | OUTPATIENT
Start: 2018-02-03 | End: 2018-02-03

## 2018-02-03 NOTE — PROGRESS NOTES
capsule Take 1 capsule by mouth 2 times daily for 5 days 10 capsule 0    Ergocalciferol (VITAMIN D2) 400 units TABS Take 600 Units by mouth daily 90 tablet 1    Norgestim-Eth Estrad Triphasic (TRI-PREVIFEM) 0.18/0.215/0.25 MG-35 MCG TABS Take 1 tablet by mouth daily 90 tablet 3    terbinafine (LAMISIL) 1 % cream Apply topically 2 times daily. 24 g 1    senna (SENOKOT) 8.6 MG tablet Take 1 tablet by mouth 2 times daily 90 tablet 2    SUMAtriptan (IMITREX) 50 MG tablet Take 1 tablet by mouth once as needed for Migraine 9 tablet 3     No current facility-administered medications for this visit. Review of Systems   Constitutional: Negative for chills, fatigue and fever. Respiratory: Negative for cough and shortness of breath. Cardiovascular: Negative for chest pain. Gastrointestinal: Negative for abdominal pain, anorexia, constipation, diarrhea and nausea. Genitourinary: Positive for vaginal discharge (mild). Negative for dysuria, flank pain, frequency, hematuria and urgency. Musculoskeletal: Negative for joint pain. Skin: Negative for rash. Allergic/Immunologic: Positive for environmental allergies. Neurological: Negative for headaches. PMH, Surgical Hx, Family Hx, and Social Hx reviewed and updated. Health Maintenance reviewed. Objective    Vitals:    02/03/18 1142   BP: 122/78   Site: Left Arm   Position: Sitting   Cuff Size: Medium Adult   Pulse: 89   Temp: 97.9 °F (36.6 °C)   TempSrc: Tympanic   SpO2: 99%   Weight: 164 lb (74.4 kg)   Height: 5' 4\" (1.626 m)       Physical Exam   Constitutional: She is oriented to person, place, and time. Vital signs are normal. She appears well-developed and well-nourished. HENT:   Head: Normocephalic and atraumatic.    Right Ear: Hearing and external ear normal.   Left Ear: Hearing and external ear normal.   Nose: Nose normal.   Mouth/Throat: Mucous membranes are normal.   Eyes: Conjunctivae and lids are normal.   Neck: Normal range of motion. Neck supple. Cardiovascular: Normal rate, regular rhythm, S1 normal, S2 normal and normal heart sounds. Exam reveals no gallop and no friction rub. No murmur heard. Pulmonary/Chest: Effort normal and breath sounds normal.   Abdominal: Normal appearance and bowel sounds are normal. There is no hepatosplenomegaly. There is no rebound and no CVA tenderness. Musculoskeletal: Normal range of motion. Neurological: She is alert and oriented to person, place, and time. Skin: Skin is warm and dry. Psychiatric: Her mood appears anxious. She does not exhibit a depressed mood. Patient became teary when discussing results of vaginal culture. States does not want STI. Education and active listening provided. Patient tearfulness subsided   Vitals reviewed. Assessment & Plan   1. Antibiotic-induced yeast infection  fluconazole (DIFLUCAN) 150 MG tablet     Results for POC orders placed in visit on 02/03/18   POCT Urinalysis No Micro (Auto)   Result Value Ref Range    Color, UA nguyen     Clarity, UA turbid     Glucose, UA POC neg     Bilirubin, UA 1+     Ketones, UA Positive     Spec Grav, UA 1.030     Blood, UA POC negative     pH, UA 6.0     Protein, UA POC 1+     Urobilinogen, UA positive     Leukocytes, UA positive     Nitrite, UA negative        Continue taking antibiotic  Diflucan for yeast infection  Educated patient on results of vaginal culture and symptoms of UTI  Advised to increase fluids, especially water  May drink cranberry juice  If symptoms worsen or do not improve follow up with PCP    Return if symptoms worsen or fail to improve, for follow up with PCP. Reviewed with the patient: current clinical status, medications, activities and diet. Side effects, adverse effects of the medication prescribed today, as well as treatment plan/ rationale and result expectations have been discussed with the patient who expresses understanding and desires to proceed.     Close follow up to

## 2018-02-05 ASSESSMENT — ENCOUNTER SYMPTOMS
SHORTNESS OF BREATH: 0
CONSTIPATION: 0
ABDOMINAL PAIN: 0
COUGH: 0
NAUSEA: 0
DIARRHEA: 0

## 2018-03-29 ENCOUNTER — TELEPHONE (OUTPATIENT)
Dept: FAMILY MEDICINE CLINIC | Age: 43
End: 2018-03-29

## 2018-09-06 DIAGNOSIS — Z30.9 ENCOUNTER FOR CONTRACEPTIVE MANAGEMENT, UNSPECIFIED TYPE: ICD-10-CM

## 2018-09-06 RX ORDER — NORGESTIMATE AND ETHINYL ESTRADIOL 7DAYSX3 28
KIT ORAL
Qty: 90 TABLET | Refills: 3 | OUTPATIENT
Start: 2018-09-06

## 2018-09-06 RX ORDER — ALBUTEROL SULFATE 90 UG/1
2 AEROSOL, METERED RESPIRATORY (INHALATION) EVERY 4 HOURS PRN
Qty: 1 INHALER | Refills: 1 | OUTPATIENT
Start: 2018-09-06

## 2018-09-07 NOTE — TELEPHONE ENCOUNTER
PT WAS UPSET, STATING SHE HAD HER WELLNESS/PAP IN DECEMBER AND SHOULDN'T HAVE TO COME IN FOR A APPT TO REFILL BIRTH CONTROL.  SHE IS GOING TO TRY THE WALK IN CLINIC IN [place] BECAUSE SHE IS OUT OF MEDICATION

## 2018-09-20 ENCOUNTER — OFFICE VISIT (OUTPATIENT)
Dept: PRIMARY CARE CLINIC | Age: 43
End: 2018-09-20
Payer: COMMERCIAL

## 2018-09-20 VITALS
TEMPERATURE: 98.2 F | SYSTOLIC BLOOD PRESSURE: 110 MMHG | BODY MASS INDEX: 31.24 KG/M2 | HEART RATE: 75 BPM | HEIGHT: 64 IN | OXYGEN SATURATION: 98 % | DIASTOLIC BLOOD PRESSURE: 74 MMHG | RESPIRATION RATE: 16 BRPM | WEIGHT: 183 LBS

## 2018-09-20 DIAGNOSIS — N89.8 VAGINAL DISCHARGE: ICD-10-CM

## 2018-09-20 DIAGNOSIS — R30.0 DYSURIA: ICD-10-CM

## 2018-09-20 DIAGNOSIS — N89.8 VAGINAL DISCHARGE: Primary | ICD-10-CM

## 2018-09-20 LAB
BILIRUBIN, POC: NORMAL
BLOOD URINE, POC: NORMAL
CLARITY, POC: CLEAR
COLOR, POC: YELLOW
GLUCOSE URINE, POC: NORMAL
KETONES, POC: NORMAL
LEUKOCYTE EST, POC: NORMAL
NITRITE, POC: NORMAL
PH, POC: 6
PROTEIN, POC: NORMAL
SPECIFIC GRAVITY, POC: 1.02
UROBILINOGEN, POC: NORMAL

## 2018-09-20 PROCEDURE — 81002 URINALYSIS NONAUTO W/O SCOPE: CPT | Performed by: PHYSICIAN ASSISTANT

## 2018-09-20 PROCEDURE — 99213 OFFICE O/P EST LOW 20 MIN: CPT | Performed by: PHYSICIAN ASSISTANT

## 2018-09-20 RX ORDER — METRONIDAZOLE 500 MG/1
500 TABLET ORAL 2 TIMES DAILY
Qty: 14 TABLET | Refills: 0 | Status: SHIPPED | OUTPATIENT
Start: 2018-09-20 | End: 2018-09-27

## 2018-09-20 RX ORDER — FLUCONAZOLE 150 MG/1
TABLET ORAL
Qty: 2 TABLET | Refills: 0 | Status: SHIPPED | OUTPATIENT
Start: 2018-09-20 | End: 2019-02-11

## 2018-09-20 ASSESSMENT — PATIENT HEALTH QUESTIONNAIRE - PHQ9
1. LITTLE INTEREST OR PLEASURE IN DOING THINGS: 0
2. FEELING DOWN, DEPRESSED OR HOPELESS: 0
SUM OF ALL RESPONSES TO PHQ QUESTIONS 1-9: 0
SUM OF ALL RESPONSES TO PHQ QUESTIONS 1-9: 0
SUM OF ALL RESPONSES TO PHQ9 QUESTIONS 1 & 2: 0

## 2018-09-20 ASSESSMENT — ENCOUNTER SYMPTOMS
SORE THROAT: 0
CONSTIPATION: 0
DIARRHEA: 0
NAUSEA: 0
VOMITING: 0

## 2018-09-20 NOTE — PROGRESS NOTES
are normal. She exhibits no distension and no mass. There is no tenderness. There is no rebound, no guarding and no CVA tenderness. Lymphadenopathy:     She has no cervical adenopathy. Skin: She is not diaphoretic. Vitals reviewed. Assessment and Plan      ICD-10-CM ICD-9-CM    1. Vaginal discharge N89.8 623.5 Wet Prep, Genital      C.trachomatis N.gonorrhoeae DNA, Urine      metroNIDAZOLE (FLAGYL) 500 MG tablet      fluconazole (DIFLUCAN) 150 MG tablet   2. Dysuria R30.0 788.1 POCT Urinalysis no Micro      Urine Culture       Orders Placed This Encounter   Procedures    Wet Prep, Genital     Standing Status:   Future     Standing Expiration Date:   9/20/2019    Urine Culture     Standing Status:   Future     Standing Expiration Date:   9/20/2019     Order Specific Question:   Specify (ex-cath, midstream, cysto, etc)? Answer:   midstream    C.trachomatis N.gonorrhoeae DNA, Urine     Standing Status:   Future     Standing Expiration Date:   9/20/2019    POCT Urinalysis no Micro       Orders Placed This Encounter   Medications    metroNIDAZOLE (FLAGYL) 500 MG tablet     Sig: Take 1 tablet by mouth 2 times daily for 7 days     Dispense:  14 tablet     Refill:  0    fluconazole (DIFLUCAN) 150 MG tablet     Sig: Take 1 tablet by mouth and repeat in 3 days. Dispense:  2 tablet     Refill:  0       Reviewed with the patient: current clinical status, medications, activities and diet. Side effects, adverse effects of the medication prescribed today, as well as treatment plan and result expectations have been discussed with the patient who expresses understanding and desires to proceed. Close follow up to evaluate treatment results and for coordination of care. I have reviewed the patient's medical history in detail and updated the computerized patient record. Return if symptoms worsen or fail to improve, for follow up with PCP.     ROSETTE Londono

## 2018-09-22 LAB — URINE CULTURE, ROUTINE: NORMAL

## 2018-09-27 ENCOUNTER — TELEPHONE (OUTPATIENT)
Dept: PRIMARY CARE CLINIC | Age: 43
End: 2018-09-27

## 2018-09-27 LAB
C. TRACHOMATIS DNA ,URINE: NEGATIVE
N. GONORRHOEAE DNA, URINE: NEGATIVE

## 2018-11-30 ENCOUNTER — TELEPHONE (OUTPATIENT)
Dept: FAMILY MEDICINE CLINIC | Age: 43
End: 2018-11-30

## 2019-02-11 ENCOUNTER — OFFICE VISIT (OUTPATIENT)
Dept: PRIMARY CARE CLINIC | Age: 44
End: 2019-02-11
Payer: COMMERCIAL

## 2019-02-11 VITALS
HEART RATE: 129 BPM | OXYGEN SATURATION: 98 % | DIASTOLIC BLOOD PRESSURE: 80 MMHG | HEIGHT: 64 IN | BODY MASS INDEX: 33.12 KG/M2 | TEMPERATURE: 98.4 F | SYSTOLIC BLOOD PRESSURE: 122 MMHG | WEIGHT: 194 LBS | RESPIRATION RATE: 16 BRPM

## 2019-02-11 DIAGNOSIS — B37.9 ANTIBIOTIC-INDUCED YEAST INFECTION: ICD-10-CM

## 2019-02-11 DIAGNOSIS — B00.9 HSV (HERPES SIMPLEX VIRUS) INFECTION: ICD-10-CM

## 2019-02-11 DIAGNOSIS — R39.9 UTI SYMPTOMS: Primary | ICD-10-CM

## 2019-02-11 DIAGNOSIS — T36.95XA ANTIBIOTIC-INDUCED YEAST INFECTION: ICD-10-CM

## 2019-02-11 PROCEDURE — 99213 OFFICE O/P EST LOW 20 MIN: CPT | Performed by: PHYSICIAN ASSISTANT

## 2019-02-11 PROCEDURE — 81002 URINALYSIS NONAUTO W/O SCOPE: CPT | Performed by: PHYSICIAN ASSISTANT

## 2019-02-11 RX ORDER — SULFAMETHOXAZOLE AND TRIMETHOPRIM 800; 160 MG/1; MG/1
1 TABLET ORAL 2 TIMES DAILY
Qty: 14 TABLET | Refills: 0 | Status: SHIPPED | OUTPATIENT
Start: 2019-02-11 | End: 2019-02-18

## 2019-02-11 RX ORDER — VALACYCLOVIR HYDROCHLORIDE 500 MG/1
TABLET, FILM COATED ORAL
Qty: 20 TABLET | Refills: 1 | Status: SHIPPED | OUTPATIENT
Start: 2019-02-11 | End: 2021-12-01 | Stop reason: SDUPTHER

## 2019-02-11 RX ORDER — FLUCONAZOLE 150 MG/1
TABLET ORAL
Qty: 2 TABLET | Refills: 0 | Status: SHIPPED | OUTPATIENT
Start: 2019-02-11 | End: 2021-06-16 | Stop reason: ALTCHOICE

## 2019-02-11 ASSESSMENT — PATIENT HEALTH QUESTIONNAIRE - PHQ9: DEPRESSION UNABLE TO ASSESS: PT REFUSES

## 2019-02-11 ASSESSMENT — ENCOUNTER SYMPTOMS
VOMITING: 0
NAUSEA: 0

## 2019-02-12 DIAGNOSIS — R39.9 UTI SYMPTOMS: ICD-10-CM

## 2019-02-14 LAB
ORGANISM: ABNORMAL
URINE CULTURE, ROUTINE: ABNORMAL
URINE CULTURE, ROUTINE: ABNORMAL

## 2019-05-22 DIAGNOSIS — T36.95XA ANTIBIOTIC-INDUCED YEAST INFECTION: ICD-10-CM

## 2019-05-22 DIAGNOSIS — B37.9 ANTIBIOTIC-INDUCED YEAST INFECTION: ICD-10-CM

## 2019-05-22 RX ORDER — ALBUTEROL SULFATE 90 UG/1
2 AEROSOL, METERED RESPIRATORY (INHALATION) EVERY 4 HOURS PRN
Qty: 1 INHALER | Refills: 1 | OUTPATIENT
Start: 2019-05-22

## 2019-05-22 NOTE — TELEPHONE ENCOUNTER
1st attempt to reach patient. Called patient @ [phone] and left message on machine for patient to return call during normal business hours of 8:30 AM and 5 PM @ [phone] option 2.

## 2019-05-22 NOTE — TELEPHONE ENCOUNTER
Pharmacy is requesting medication refill. Please approve or deny this request.    Rx requested:  Requested Prescriptions     Pending Prescriptions Disp Refills    albuterol sulfate  (90 Base) MCG/ACT inhaler 1 Inhaler 1     Sig: Inhale 2 puffs into the lungs every 4 hours as needed for Wheezing or Shortness of Breath         Last Office Visit:   12/19/2017      Next Visit Date:  No future appointments.

## 2021-06-08 ENCOUNTER — NURSE TRIAGE (OUTPATIENT)
Dept: OTHER | Facility: CLINIC | Age: 46
End: 2021-06-08

## 2021-06-08 NOTE — TELEPHONE ENCOUNTER
Reason for Disposition   Knee pain is a chronic symptom (recurrent or ongoing AND lasting > 4 weeks)    Answer Assessment - Initial Assessment Questions  1. LOCATION and RADIATION: \"Where is the pain located? \"       Lt knee, no radiation    2. QUALITY: \"What does the pain feel like? \"  (e.g., sharp, dull, aching, burning)  sharp    3. SEVERITY: \"How bad is the pain? \" \"What does it keep you from doing? \"   (Scale 1-10; or mild, moderate, severe)    -  MILD (1-3): doesn't interfere with normal activities     -  MODERATE (4-7): interferes with normal activities (e.g., work or school) or awakens from sleep, limping     -  SEVERE (8-10): excruciating pain, unable to do any normal activities, unable to walk  Sitting is 2/10, using stairs is 9/10    4. ONSET: \"When did the pain start? \" \"Does it come and go, or is it there all the time? \"  Years ago, worse as of yesterday, comes and goes    5. RECURRENT: \"Have you had this pain before? \" If so, ask: \"When, and what happened then? \"  Off/on for years, never been evaluated    6. SETTING: \"Has there been any recent work, exercise or other activity that involved that part of the body? \"   No    7. AGGRAVATING FACTORS: \"What makes the knee pain worse? \" (e.g., walking, climbing stairs, running)  Worse-walking up and down stairs    8. ASSOCIATED SYMPTOMS: \"Is there any swelling or redness of the knee? \"  No    9. OTHER SYMPTOMS: \"Do you have any other symptoms? \" (e.g., chest pain, difficulty breathing, fever, calf pain)  No    10. PREGNANCY: \"Is there any chance you are pregnant? \" \"When was your last menstrual period? \"  No chance of pregnancy    Protocols used: KNEE PAIN-ADULT-[place]    Received call from [employer] at [facility]) David with Red Flag Complaint.     Brief description of triage: knee pain-chronic (also wants to discuss weight gain and switch PCPs)    Triage indicates for patient to be seen within 2 weeks    Care advice provided, patient verbalizes understanding;

## 2021-06-16 ENCOUNTER — OFFICE VISIT (OUTPATIENT)
Dept: FAMILY MEDICINE CLINIC | Age: 46
End: 2021-06-16
Payer: COMMERCIAL

## 2021-06-16 VITALS
BODY MASS INDEX: 38.41 KG/M2 | WEIGHT: 216.8 LBS | TEMPERATURE: 97.7 F | DIASTOLIC BLOOD PRESSURE: 80 MMHG | HEIGHT: 63 IN | SYSTOLIC BLOOD PRESSURE: 136 MMHG | OXYGEN SATURATION: 99 % | HEART RATE: 77 BPM

## 2021-06-16 DIAGNOSIS — M25.569 CHRONIC KNEE PAIN, UNSPECIFIED LATERALITY: ICD-10-CM

## 2021-06-16 DIAGNOSIS — Z11.59 NEED FOR HEPATITIS C SCREENING TEST: ICD-10-CM

## 2021-06-16 DIAGNOSIS — R53.83 FATIGUE, UNSPECIFIED TYPE: ICD-10-CM

## 2021-06-16 DIAGNOSIS — Z11.4 ENCOUNTER FOR SCREENING FOR HIV: ICD-10-CM

## 2021-06-16 DIAGNOSIS — G89.29 CHRONIC KNEE PAIN, UNSPECIFIED LATERALITY: ICD-10-CM

## 2021-06-16 LAB
CONTROL: NORMAL
HEPATITIS C ANTIBODY INTERPRETATION: NORMAL
PREGNANCY TEST URINE, POC: NEGATIVE
TSH REFLEX: 2.38 UIU/ML (ref 0.44–3.86)

## 2021-06-16 PROCEDURE — G8427 DOCREV CUR MEDS BY ELIG CLIN: HCPCS | Performed by: FAMILY MEDICINE

## 2021-06-16 PROCEDURE — G8417 CALC BMI ABV UP PARAM F/U: HCPCS | Performed by: FAMILY MEDICINE

## 2021-06-16 PROCEDURE — 1036F TOBACCO NON-USER: CPT | Performed by: FAMILY MEDICINE

## 2021-06-16 PROCEDURE — 99214 OFFICE O/P EST MOD 30 MIN: CPT | Performed by: FAMILY MEDICINE

## 2021-06-16 PROCEDURE — 81025 URINE PREGNANCY TEST: CPT | Performed by: FAMILY MEDICINE

## 2021-06-16 RX ORDER — PHENTERMINE HYDROCHLORIDE 37.5 MG/1
37.5 TABLET ORAL
Qty: 30 TABLET | Refills: 0 | Status: SHIPPED | OUTPATIENT
Start: 2021-06-16 | End: 2021-07-14 | Stop reason: SDUPTHER

## 2021-06-16 RX ORDER — DESOGESTREL AND ETHINYL ESTRADIOL 0.15-0.03
KIT ORAL
COMMUNITY
Start: 2021-06-13 | End: 2022-10-19 | Stop reason: ALTCHOICE

## 2021-06-16 SDOH — ECONOMIC STABILITY: TRANSPORTATION INSECURITY
IN THE PAST 12 MONTHS, HAS THE LACK OF TRANSPORTATION KEPT YOU FROM MEDICAL APPOINTMENTS OR FROM GETTING MEDICATIONS?: NO

## 2021-06-16 SDOH — ECONOMIC STABILITY: TRANSPORTATION INSECURITY
IN THE PAST 12 MONTHS, HAS LACK OF TRANSPORTATION KEPT YOU FROM MEETINGS, WORK, OR FROM GETTING THINGS NEEDED FOR DAILY LIVING?: NO

## 2021-06-16 SDOH — ECONOMIC STABILITY: FOOD INSECURITY: WITHIN THE PAST 12 MONTHS, YOU WORRIED THAT YOUR FOOD WOULD RUN OUT BEFORE YOU GOT MONEY TO BUY MORE.: NEVER TRUE

## 2021-06-16 SDOH — ECONOMIC STABILITY: FOOD INSECURITY: WITHIN THE PAST 12 MONTHS, THE FOOD YOU BOUGHT JUST DIDN'T LAST AND YOU DIDN'T HAVE MONEY TO GET MORE.: NEVER TRUE

## 2021-06-16 ASSESSMENT — PATIENT HEALTH QUESTIONNAIRE - PHQ9
SUM OF ALL RESPONSES TO PHQ QUESTIONS 1-9: 2
1. LITTLE INTEREST OR PLEASURE IN DOING THINGS: 1
2. FEELING DOWN, DEPRESSED OR HOPELESS: 1
SUM OF ALL RESPONSES TO PHQ QUESTIONS 1-9: 2
SUM OF ALL RESPONSES TO PHQ QUESTIONS 1-9: 2
SUM OF ALL RESPONSES TO PHQ9 QUESTIONS 1 & 2: 2

## 2021-06-16 ASSESSMENT — SOCIAL DETERMINANTS OF HEALTH (SDOH): HOW HARD IS IT FOR YOU TO PAY FOR THE VERY BASICS LIKE FOOD, HOUSING, MEDICAL CARE, AND HEATING?: NOT HARD AT ALL

## 2021-06-16 NOTE — PROGRESS NOTES
Chief Complaint   Patient presents with   [address]     Was being see by Dr. Ted Jerez prior.  Weight Management     Would like to discuss her weight gain. Would like to discuss options. States she has tried diet & exercise with no results.  Knee Pain     States she has chronic left knee pain. Last week it became worse. Denies any swelling. Denies any new injury. Has been taking OTC Ibuprofen with min. relief. HPI: Shaina Perez 48 y.o. female presenting for     Left Knee pain    Shaina Perez is a 48 y.o. female who presents with knee pain involving the left knee. Onset was yearss ago. Worse with going up steps. Admits to crepitus. Denies any injury to the knee. TENS unit which did help but reports that the pain came back days later. Has not been to PT. Has not had xrays of the knee. Has recently increased her physical activity (does kickboxing). This does aggravate her pain. Obesity   Patient reports since April she has gained about 19 pounds. Devi was keto diet. Reports she lost several pounds but not significant. She was able at one point to get down to 197 pounds. Patient tried restriction and avoid late night eating. Patient has been eating more fruits, chicken. Patient reports that years ago she was on adipex. At that time she lost 90 pounds. Denies any side effects on the medication. Devi would like to have a level     Menorrhagia   Takes OCP to help with regulate her periods   Denies any issues with the medication. Cycles are regular. Current Outpatient Medications   Medication Sig Dispense Refill    APRI 0.15-30 MG-MCG per tablet       phentermine (ADIPEX-P) 37.5 MG tablet Take 1 tablet by mouth every morning (before breakfast) for 30 days. 30 tablet 0    valACYclovir (VALTREX) 500 MG tablet TAKE 1 TABLET BY MOUTH 2 TIMES DAILY FOR 10 DAYS 20 tablet 1    mometasone (ELOCON) 0.1 % cream Apply topically daily.  15 g 1    albuterol sulfate  (90 Base) MCG/ACT inhaler Inhale 2 puffs into the lungs every 4 hours as needed for Wheezing or Shortness of Breath 1 Inhaler 1    SUMAtriptan (IMITREX) 50 MG tablet Take 1 tablet by mouth once as needed for Migraine 9 tablet 3     No current facility-administered medications for this visit. ROS  CONSTITUTIONAL: The patient denies fevers, chills, sweats and body ache. Admits to weight gain   HEENT: Denies headache, blurry vision, eye pain, tinnitus, vertigo,  sore throat, neck or thyroid masses. RESPIRATORY: Denies cough, sputum, hemoptysis. CARDIAC: Denies chest pain, pressure, palpitations, Denies lower extremity edema. GASTROINTESTINAL: Denies abdominal pain, constipation, diarrhea, bleeding in the stools,   GENITOURINARY: Denies dysuria, hematuria, nocturia or frequency, urinary incontinence. NEUROLOGIC: Denies headaches, dizziness, syncope, weakness  MUSCULOSKELETAL: admits to left knee pain. ENDOCRINOLOGY: Denies heat or cold intolerance. HEMATOLOGY: Denies easy bleeding or blood transfusion,anemia  DERMATOLOGY: Denies changes in moles or pigmentation changes. PSYCHIATRY: Denies depression, agitation or anxiety.     Past Medical History:   Diagnosis Date    Anemia     Asthma     Migraines     Staph infection     -hx staph colonization        Past Surgical History:   Procedure Laterality Date    ABDOMEN SURGERY  1997    mass removed     SECTION          Family History   Problem Relation Age of Onset    Hypertension Mother     Asthma Mother     Hypertension Father     Heart Disease Father     Stomach Cancer Father     Emphysema Father         Social History     Socioeconomic History    Marital status:      Spouse name: Not on file    Number of children: Not on file    Years of education: Not on file    Highest education level: Not on file   Occupational History    Not on file   Tobacco Use    Smoking status: Never Smoker    Smokeless tobacco: Never Used   Substance and Sexual Activity    Alcohol use: Yes     Alcohol/week: 0.0 standard drinks    Drug use: No    Sexual activity: Not on file   Other Topics Concern    Not on file   Social History Narrative    Not on file     Social Determinants of Health     Financial Resource Strain: Low Risk     Difficulty of Paying Living Expenses: Not hard at all   Food Insecurity: No Food Insecurity    Worried About Running Out of Food in the Last Year: Never true    Santa of Food in the Last Year: Never true   Transportation Needs: No Transportation Needs    Lack of Transportation (Medical): No    Lack of Transportation (Non-Medical):  No   Physical Activity:     Days of Exercise per Week:     Minutes of Exercise per Session:    Stress:     Feeling of Stress :    Social Connections:     Frequency of Communication with Friends and Family:     Frequency of Social Gatherings with Friends and Family:     Attends Cheondoism Services:     Active Member of Clubs or Organizations:     Attends Club or Organization Meetings:     Marital Status:    Intimate Partner Violence:     Fear of Current or Ex-Partner:     Emotionally Abused:     Physically Abused:     Sexually Abused:         /80   Pulse 77   Temp 97.7 °F (36.5 °C)   Ht 5' 3\" (1.6 m)   Wt 216 lb 12.8 oz (98.3 kg)   SpO2 99%   BMI 38.40 kg/m²        Physical Exam:    General appearance - alert, well appearing, and in no distress  Mental Status - alert, oriented to person, place, and time  Eyes - pupils equal and reactive, extraocular eye movements intact   Ears - bilateral TM's and external ear canals normal   Nose - normal and patent, no erythema, discharge or polyps   Sinuses - Normal paranasal sinuses without tenderness   Throat - mucous membranes moist, pharynx normal without lesions   Neck - supple, no significant adenopathy   Thyroid - thyroid is normal in size without nodules or tenderness    Chest - clear to auscultation, no

## 2021-06-16 NOTE — PATIENT INSTRUCTIONS
Patient Education        Patellofemoral Pain Syndrome: Care Instructions  Your Care Instructions     Patellofemoral pain syndrome is pain in the front of the knee. It is caused by overuse, weak thigh muscles (quadriceps), or a problem with the way the kneecap moves. Extra weight may also cause this syndrome. The patella is the kneecap, and the femur is the thighbone. Some people may have pain in the front of the knee from a condition called chondromalacia. In this problem, the underside of the knee cartilage wears down and frays. Cartilage is a rubbery tissue that cushions joints. In some cases, the kneecap does not move, or track, in a normal way. You may have knee pain when you run, walk down hills or steps, or do another activity. Sitting for a long time also can cause knee pain. Your knee pain may get better with medicines for pain and swelling and exercises to make your quadriceps stronger. Losing weight, if you need to, may also help with pain. Follow-up care is a key part of your treatment and safety. Be sure to make and go to all appointments, and call your doctor if you are having problems. It's also a good idea to know your test results and keep a list of the medicines you take. How can you care for yourself at home? · Ask your doctor if you can take an over-the-counter pain medicine, such as acetaminophen (Tylenol), ibuprofen (Advil, Motrin), or naproxen (Aleve). Be safe with medicines. Read and follow all instructions on the label. · Rest and protect your knee. Take a break from activities that cause pain, such as long periods of sitting or kneeling. · Put ice or a cold pack on your knee for 10 to 20 minutes after activity. Put a thin cloth between the ice and your skin. · If your doctor recommends an elastic bandage, sleeve, or other type of support for your knee, wear it as directed. · If your knee is not swollen, you can put moist heat, a heating pad, or a warm cloth on your knee.  After several days of rest, you can begin gentle exercise of your knee. · Reach and stay at a healthy weight. Being overweight puts stress on your knees. · Wear athletic shoes that offer good support, especially if you run. · Use shoe inserts, or orthotics, if they help reduce your knee pain. Many drugstores and shoe stores sell them. · See a physical therapist to learn more exercises and stretches to make your legs stronger. When should you call for help? Watch closely for changes in your health, and be sure to contact your doctor if:    · Your knee pain does not get better or gets worse. Where can you learn more? Go to https://Gl[employer] Directpepiceweb.[employer]. org and sign in to your [facility] account. Enter V200 in the [employer] box to learn more about \"Patellofemoral Pain Syndrome: Care Instructions. \"     If you do not have an account, please click on the \"Sign Up Now\" link. Current as of: November 16, 2020               Content Version: 12.9  © 2006-2021 [employer]. Care instructions adapted under license by [facility] ([facility]). If you have questions about a medical condition or this instruction, always ask your healthcare professional. [place] [address] any warranty or liability for your use of this information. Patient Education        Patellofemoral Pain Syndrome (Runner's Knee): Exercises  Introduction  Here are some examples of exercises for you to try. The exercises may be suggested for a condition or for rehabilitation. Start each exercise slowly. Ease off the exercises if you start to have pain. You will be told when to start these exercises and which ones will work best for you. How to do the exercises  Calf wall stretch   1. Stand facing a wall with your hands on the wall at about eye level. Put your affected leg about a step behind your other leg.   2. Keeping your back leg straight and your back heel on the floor, bend your front knee and gently bring your hip and chest toward the wall until you feel a stretch in the calf of your back leg. 3. Hold the stretch for at least 15 to 30 seconds. 4. Repeat 2 to 4 times. 5. Repeat steps 1 through 4, but this time keep your back knee bent. Quadriceps stretch   1. If you are not steady on your feet, hold on to a chair, counter, or wall. 2. Bend your affected leg, and reach behind you to grab the front of your foot or ankle with the hand on the same side. For example, if you are stretching your right leg, use your right hand. 3. Keeping your knees next to each other, pull your foot toward your buttock until you feel a gentle stretch across the front of your hip and down the front of your thigh. Your knee should be pointed directly to the ground, and not out to the side. 4. Hold the stretch for at least 15 to 30 seconds. 5. Repeat 2 to 4 times. Hamstring wall stretch   1. Lie on your back in a doorway, with your good leg through the open door. 2. Slide your affected leg up the wall to straighten your knee. You should feel a gentle stretch down the back of your leg. 3. Hold the stretch for at least 1 minute. Then over time, try to lengthen the time you hold the stretch to as long as 6 minutes. 4. Repeat 2 to 4 times. 5. If you do not have a place to do this exercise in a doorway, there is another way to do it:  6. Lie on your back, and bend your affected leg. 7. Loop a towel under the ball and toes of that foot, and hold the ends of the towel in your hands. 8. Straighten your knee, and slowly pull back on the towel. You should feel a gentle stretch down the back of your leg. 9. Hold the stretch for at least 15 to 30 seconds. Or even better, hold the stretch for 1 minute if you can. 10. Repeat 2 to 4 times. 1. Do not arch your back. 2. Do not bend either knee. 3. Keep one heel touching the floor and the other heel touching the wall. Do not point your toes. Quad sets   1.  Sit with your affected leg straight and supported on the floor or a firm bed. Place a small, rolled-up towel under your affected knee. Your other leg should be bent, with that foot flat on the floor. 2. Tighten the thigh muscles of your affected leg by pressing the back of your knee down into the towel. 3. Hold for about 6 seconds, then rest for up to 10 seconds. 4. Repeat 8 to 12 times. Straight-leg raises to the front   1. Lie on your back with your good knee bent so that your foot rests flat on the floor. Your affected leg should be straight. Make sure that your low back has a normal curve. You should be able to slip your hand in between the floor and the small of your back, with your palm touching the floor and your back touching the back of your hand. 2. Tighten the thigh muscles in your affected leg by pressing the back of your knee flat down to the floor. Hold your knee straight. 3. Keeping the thigh muscles tight and your leg straight, lift your affected leg up so that your heel is about 12 inches off the floor. 4. Hold for about 6 seconds, then lower your leg slowly. Rest for up to 10 seconds between repetitions. 5. Repeat 8 to 12 times. Straight-leg raises to the back   1. Lie on your stomach, and lift your leg straight up behind you (toward the ceiling). 2. Lift your toes about 6 inches off the floor, hold for about 6 seconds, then lower slowly. 3. Do 8 to 12 repetitions. Wall slide with ball squeeze   1. Stand with your back against a wall and with your feet about shoulder-width apart. Your feet should be about 12 inches away from the wall. 2. Put a ball about the size of a soccer ball between your knees. Then slowly slide down the wall until your knees are bent about 20 to 30 degrees. 3. Tighten your thigh muscles by squeezing the ball between your knees. Hold that position for about 10 seconds, then stop squeezing. Rest for up to 10 seconds between repetitions. 4. Repeat 8 to 12 times.     Follow-up care is a

## 2021-06-17 LAB — HIV AG/AB: NONREACTIVE

## 2021-06-18 DIAGNOSIS — M25.562 PATELLOFEMORAL ARTHRALGIA OF LEFT KNEE: ICD-10-CM

## 2021-06-18 DIAGNOSIS — Z12.31 ENCOUNTER FOR SCREENING MAMMOGRAM FOR MALIGNANT NEOPLASM OF BREAST: Primary | ICD-10-CM

## 2021-06-18 DIAGNOSIS — Z80.3 FAMILY HISTORY OF BREAST CANCER: ICD-10-CM

## 2021-06-22 ENCOUNTER — HOSPITAL ENCOUNTER (OUTPATIENT)
Dept: WOMENS IMAGING | Age: 46
Discharge: HOME | End: 2021-06-24
Payer: COMMERCIAL

## 2021-06-22 DIAGNOSIS — Z80.3 FAMILY HISTORY OF BREAST CANCER: ICD-10-CM

## 2021-06-22 DIAGNOSIS — Z12.31 ENCOUNTER FOR SCREENING MAMMOGRAM FOR MALIGNANT NEOPLASM OF BREAST: ICD-10-CM

## 2021-06-22 PROCEDURE — 77063 BREAST TOMOSYNTHESIS BI: CPT

## 2021-06-25 ENCOUNTER — HOSPITAL ENCOUNTER (OUTPATIENT)
Dept: PHYSICAL THERAPY | Age: 46
Setting detail: THERAPIES SERIES
Discharge: HOME | End: 2021-06-25
Payer: COMMERCIAL

## 2021-06-25 PROCEDURE — 97162 PT EVAL MOD COMPLEX 30 MIN: CPT

## 2021-06-25 PROCEDURE — 97110 THERAPEUTIC EXERCISES: CPT

## 2021-06-25 ASSESSMENT — PAIN DESCRIPTION - DESCRIPTORS: DESCRIPTORS: SHARP;SHOOTING

## 2021-06-25 ASSESSMENT — PAIN DESCRIPTION - ONSET: ONSET: SUDDEN

## 2021-06-25 ASSESSMENT — PAIN DESCRIPTION - ORIENTATION: ORIENTATION: LEFT

## 2021-06-25 ASSESSMENT — PAIN DESCRIPTION - PAIN TYPE: TYPE: ACUTE PAIN

## 2021-06-25 ASSESSMENT — PAIN DESCRIPTION - LOCATION: LOCATION: KNEE

## 2021-06-25 ASSESSMENT — PAIN DESCRIPTION - FREQUENCY: FREQUENCY: INTERMITTENT

## 2021-06-25 ASSESSMENT — PAIN DESCRIPTION - PROGRESSION: CLINICAL_PROGRESSION: GRADUALLY WORSENING

## 2021-06-25 ASSESSMENT — PAIN SCALES - GENERAL: PAINLEVEL_OUTOF10: 0

## 2021-06-25 NOTE — PROGRESS NOTES
[facility]  PHYSICAL THERAPY EVALUATION    Date: 2021  Patient Name: Marisol Jimenez       MRN: [id number]   Account: [de-identified]   : 1975  (48 y.o.)   Gender: female   Referring Practitioner: Loly Vivar                 Diagnosis: XR = Finding may reflect cartilage injury in the knee on the patella in the patellofemoral compartment  Treatment Diagnosis: L knee pain, decreased L>R LE strength, decreased L SLS stability, impaired fucntional activity tolerance, impaired L LE biomechanics, (+) L Thessaleys and McMurrays     Past Medical History:  has a past medical history of Anemia, Asthma, Migraines, and Staph infection. Past Surgical History:   has a past surgical history that includes  section () and Abdomen surgery (). Vital Signs  Patient Currently in Pain: Yes   Pain Screening  Patient Currently in Pain: Yes  Pain Assessment  Pain Assessment: 0-10  Pain Level: 0 (Pain ranges from 0-9/10)  Pain Type: Acute pain  Pain Location: Knee  Pain Orientation: Left  Pain Descriptors: Lattie Faden; Shooting  Pain Frequency: Intermittent  Pain Onset: Sudden  Clinical Progression: Gradually worsening  Non-Pharmaceutical Pain Intervention(s): Rest     Lives With: Family  Type of Home: House  Home Layout: Able to Live on Main level with bedroom/bathroom; Two level  Home Access: Stairs to enter with rails  Entrance Stairs - Number of Steps: 2-3  ADL Assistance: Independent  Homemaking Assistance: Independent  Homemaking Responsibilities: Yes  Ambulation Assistance: Independent  Transfer Assistance: Independent  Active : Yes  Occupation: Full time employment  Type of occupation: - Currently working from home- primarily sits throughout the day  Leisure & Hobbies: working out, Boxing, walking, hiking, being outdoors  IADL Comments: Current functional level 75%  Additional Comments: Pt reports significnat difficulty with prescribed    Evaluation and patient rights have been reviewed and patient agrees with plan of care. Yes  [x]  No  []   Explain:     Elsa Fall Risk Assessment  Risk Factor Scale  Score   History of Falls [] Yes  [x] No 25  0    Secondary Diagnosis [] Yes  [x] No 15  0    Ambulatory Aid [] Furniture  [] Crutches/cane/walker  [x] None/bedrest/wheelchair/nurse 30  15  0    IV/Heparin Lock [] Yes  [x] No 20  0    Gait/Transferring [] Impaired  [] Weak  [x] Normal/bedrest/immobile 20  10  0    Mental Status [] Forgets limitations  [x] Oriented to own ability 15  0       Total: 0     Based on the Assessment score: check the appropriate box. [x]  No intervention needed   Low =   Score of 0-24  []  Use standard prevention interventions Moderate =  Score of 24-44   [] Discuss fall prevention strategies   [] Indicate moderate falls risk on eval  []  Use high risk prevention interventions High = Score of 45 and higher   [] Discuss fall prevention strategies   [] Provide supervision during treatment time    Goals  Short term goals  Time Frame for Short term goals: 2-3 weeks  Short term goal 1: The pt will have decreased L knee pain </= 1-2/10 at worst in order to increase ease with ADL's  Long term goals  Time Frame for Long term goals : 4-6 weeks  Long term goal 1: The pt will demo improved Hermelindo LE strength 5/5 in order to perform stairs reciprocally without increased pain or limitations  Long term goal 2: The pt will have an increase in LEFS score >/=10 points in order to increase functional activity tolerance  Long term goal 3: The pt will be indep/compliant with HEP in order to self manage and maintain status upon D/C  Long term goal 4: The pt will demo improved L SLS without frontal plane mvmt >30 seconds to increase ease with ambulation  Long term goal 5:  The pt will be able to demo x5 normalized squats with equal wbing in order to lift grandchild and objects from ground without pain or limitations    Treatment Initiated : ther ex and hep    PT Individual Minutes  Time In: 3441  Time Out: 7122  Minutes: 36  Timed Code Treatment Minutes: 8 Minutes  Procedure Minutes: 28 eval      Timed Activity Minutes Units   Ther Ex 8 1     Electronically signed by Keith Butcher PT on 6/25/21 at 8:59 AM EDT

## 2021-06-25 NOTE — PROGRESS NOTES
[address]  PHYSICAL THERAPY PLAN OF CARE   [address], [address] [facility]         Ph: [phone]  Fax: [phone]    [] Certification  [] Recertification [x]  Plan of Care  [] Progress Note [] Discharge      To:  Tena Hernandez      From:  Merline Frankel, PT, DPT  Patient: Rudolph Bentley     : 1975  Diagnosis: XR = Finding may reflect cartilage injury in the knee on the patella in the patellofemoral compartment     Date: 2021  Treatment Diagnosis: L knee pain, decreased L>R LE strength, decreased L SLS stability, impaired fucntional activity tolerance, impaired L LE biomechanics, (+) L Thessaleys and McMurrays     Progress Report Period from:  2021  to 2021    Total # of Visits to Date: 1   No Show: 0    Canceled Appointment: 0     OBJECTIVE:   Short Term Goals - Time Frame for Short term goals: 2-3 weeks    Goals Current/Discharge status  Met   Short term goal 1: The pt will have decreased L knee pain </= 1-2/10 at worst in order to increase ease with ADL's  9/10 at worst [] yes  [x] no     Long Term Goals - Time Frame for Long term goals : 4-6 weeks  Goals Current/ Discharge status Met   Long term goal 1: The pt will demo improved Hermelindo LE strength 5/5 in order to perform stairs reciprocally without increased pain or limitations Strength RLE  Comment: 4+/5 Hip flex, knee, Hip ext 5/5 Hip IR, ER, abd, ankle DF 4/5 hip add  Strength LLE  Comment: 4+/5 hip flex, IR, ER, abd, knee 5/5 ankle DF 4/5 Hip Add, ext  Poor tolerance to stairs-nonreciprocal  [] yes  [x] no   Long term goal 2: The pt will have an increase in LEFS score >/=10 points in order to increase functional activity tolerance 36/80 [] yes  [x] no   Long term goal 3: The pt will be indep/compliant with HEP in order to self manage and maintain status upon D/C ongoing [] yes  [x] no   Long term goal 4: The pt will demo improved L SLS without frontal plane mvmt >30 seconds to increase ease with ambulation Balance  Single Leg Stance R Le  Single Leg Stance L Le  Comments: increased frontal plane instability L [] yes  [x] no   Long term goal 5: The pt will be able to demo x5 normalized squats with equal wbing in order to lift grandchild and objects from ground without pain or limitations 1/4 squat with decreased L LE wbing and pain [] yes  [x] no      Body structures, Functions, Activity limitations: Decreased functional mobility , Decreased ADL status, Decreased ROM, Decreased strength, Increased pain, Decreased posture, Decreased balance, Decreased high-level IADLs  Assessment: Pt presents with acute/insidious onset of L knee pain beginning 21 with decreased L>R LE strength, decreased L SLS stability, impaired fucntional activity tolerance, impaired L LE biomechanics, (+) L Thessaleys and McMurrays. These impairments currenly limit her fucntional abilities to perform stairs, squat, stand, or walk prolonged periods, or perofrm recrreational activity without increased pain or limitations. Prognosis: Good, Excellent  Discharge Recommendations: Continue to assess pending progress    PT Education: Goals;PT Role;Plan of Care;Home Exercise Program  Patient Education: evaluative findings, anatomy of current condition    PLAN: [x] Evaluate and Treat  Frequency/Duration:  Plan  Times per week: 2  Plan weeks: 4-6  Current Treatment Recommendations: Strengthening, ROM, Balance Training, Neuromuscular Re-education, Manual Therapy - Soft Tissue Mobilization, Manual Therapy - Joint Manipulation, Modalities, Positioning, Home Exercise Program, Integrated Dry Needling, Patient/Caregiver Education & Training, Pain Management                  Patient Status:[x] Continue/ Initiate plan of Care    [] Discharge PT. Recommend pt continue with HEP.      [] Additional visits requested, Please re-certify for additional visits:        Signature: Electronically signed by Merline Frankel PT on 21 at 8:57 AM EDT    If you have any questions or concerns, please don't hesitate to call. Thank you for your referral.    I have reviewed this plan of care and certify a need for medically necessary rehabilitation services.     Physician Signature:__________________________________________________________  Date:  Please sign and return

## 2021-06-29 ENCOUNTER — HOSPITAL ENCOUNTER (OUTPATIENT)
Dept: PHYSICAL THERAPY | Age: 46
Setting detail: THERAPIES SERIES
Discharge: HOME | End: 2021-06-29
Payer: COMMERCIAL

## 2021-06-29 PROCEDURE — 97110 THERAPEUTIC EXERCISES: CPT

## 2021-06-29 ASSESSMENT — PAIN SCALES - GENERAL: PAINLEVEL_OUTOF10: 7

## 2021-06-29 ASSESSMENT — PAIN DESCRIPTION - LOCATION: LOCATION: KNEE

## 2021-06-29 ASSESSMENT — PAIN DESCRIPTION - DESCRIPTORS: DESCRIPTORS: SHARP;SHOOTING

## 2021-06-29 ASSESSMENT — PAIN DESCRIPTION - ORIENTATION: ORIENTATION: LEFT

## 2021-06-29 ASSESSMENT — PAIN DESCRIPTION - PAIN TYPE: TYPE: ACUTE PAIN

## 2021-06-29 NOTE — PROGRESS NOTES
[address] [place] [facility]  Outpatient Physical Therapy    Treatment Note        Date: 2021  Patient: Андрей Tillman  : 1975  ACCT #: [de-identified]  Referring Practitioner: Mary Jo Null  Diagnosis: XR = Finding may reflect cartilage injury in the knee on the patella in the patellofemoral compartment  Treatment Diagnosis: L knee pain, decreased L>R LE strength, decreased L SLS stability, impaired fucntional activity tolerance, impaired L LE biomechanics, (+) L Thessaleys and McMurrays     Visit Information:  PT Visit Information  Onset Date: 21  PT Insurance Information: [facility]  Total # of Visits to Date: 2  No Show: 0  Canceled Appointment: 0  Progress Note Counter: -    Subjective: Pt denies pain at rest, but up to 7/10 with bending this AM.  Comments: RTD next month  HEP Compliance:  [] Good [x] Fair [] Poor [] Reports not doing due to:    Vital Signs  Patient Currently in Pain: Yes   Pain Screening  Patient Currently in Pain: Yes  Pain Assessment  Pain Assessment: 0-10  Pain Level: 7  Pain Type: Acute pain  Pain Location: Knee  Pain Orientation: Left  Pain Descriptors: Saranya Smaller; Shooting    OBJECTIVE:   Exercises  Exercise 1: VMO SLR x10  Exercise 2: S/L hip abd and circles x10  Exercise 3: prone hip ext x10  Exercise 4: S/L hip add x10  Exercise 5: clams x15 - VC's to avoid excessive ROM/hip rolling  Exercise 6: single leg hip ext into pball 5''x10 b/l  Exercise 7: SLS on foam 20''x3 without UE support, some frontal plane instability observed  Exercise 8: 4'' step ups with yellow tband abd to avoid IR x10 F/L  Exercise 9: mini squats with yellow tband abd to avoid IR x10  Exercise 12: glut med wall press 5''x10 b/l  Exercise 20: HEP: cont current    Strength: [x] NT  [] MMT completed:    ROM: [x] NT  [] ROM measurements:    Modalities:  Pt declined    *Indicates exercise, modality, or manual techniques to be initiated when appropriate    Assessment:    Body structures, Physician     Frequency/Duration:  Plan  Times per week: 2  Plan weeks: 4-6  Current Treatment Recommendations: Strengthening, ROM, Balance Training, Neuromuscular Re-education, Manual Therapy - Soft Tissue Mobilization, Manual Therapy - Joint Manipulation, Modalities, Positioning, Home Exercise Program, Integrated Dry Needling, Patient/Caregiver Education & Training, Pain Management     Pt to continue current HEP. See objective section for any therapeutic exercise changes, additions or modifications this date.     PT Individual Minutes  Time In: 0804  Time Out: 1636  Minutes: 31  Timed Code Treatment Minutes: 31 Minutes  Procedure Minutes: 0     Timed Activity Minutes Units   Ther Ex 31 2     Signature:  Electronically signed by Cony Diego PTA on 6/29/21 at 8:07 AM EDT

## 2021-07-01 ENCOUNTER — APPOINTMENT (OUTPATIENT)
Dept: PHYSICAL THERAPY | Age: 46
End: 2021-07-01
Payer: COMMERCIAL

## 2021-07-06 ENCOUNTER — HOSPITAL ENCOUNTER (OUTPATIENT)
Dept: PHYSICAL THERAPY | Age: 46
Setting detail: THERAPIES SERIES
Discharge: HOME | End: 2021-07-06
Payer: COMMERCIAL

## 2021-07-06 PROCEDURE — 97110 THERAPEUTIC EXERCISES: CPT

## 2021-07-06 NOTE — DISCHARGE SUMMARY
[id number]  PHYSICAL THERAPY PLAN OF CARE   [address] [place], [address] [facility]         Ph: [phone]  Fax: [phone]    [] Certification  [] Recertification []  Plan of Care  [] Progress Note [x] Discharge      To:  Ashvin Kemp      From:  Minor Smolder PT, DPT  Patient: Jacquie Love     : 1975  Diagnosis: XR = Finding may reflect cartilage injury in the knee on the patella in the patellofemoral compartment     Date: 2021  Treatment Diagnosis: L knee pain, decreased L>R LE strength, decreased L SLS stability, impaired fucntional activity tolerance, impaired L LE biomechanics, (+) L Thessaleys and McMurrays     Progress Report Period from:  21  to 2021    Total # of Visits to Date: 3   No Show: 0    Canceled Appointment: 0     OBJECTIVE:   Short Term Goals - Time Frame for Short term goals: 2-3 weeks    Goals Current/Discharge status  Met   Short term goal 1: The pt will have decreased L knee pain </= 1-2/10 at worst in order to increase ease with ADL's  Pain ranges 0-7/10  [] yes  [x] no   Long Term Goals - Time Frame for Long term goals : 4-6 weeks  Goals Current/ Discharge status Met   Long term goal 1: The pt will demo improved Hermelindo LE strength 5/5 in order to perform stairs reciprocally without increased pain or limitations Strength RLE  Comment: 4+/5 Hip flex, knee, Hip ext, hip add 5/5 Hip IR, ER, abd, ankle DF  Strength LLE  Comment: 4+/5 hip flex, IR, ER, abd, knee flex, hip ext, hip add 5/5 ankle DF (min increase in pain w/ knee ext, hip IR/ER)  Pt unable to negotiate stairs reciprocally w/o pain [x] yes  [x] no   Long term goal 2: The pt will have an increase in LEFS score >/=10 points in order to increase functional activity tolerance LEFS 66/80 [x] yes  [] no   Long term goal 3: The pt will be indep/compliant with HEP in order to self manage and maintain status upon D/C Pt indep, compliant w/ current [x] yes  [] no   Long term goal 4: The pt will demo improved L SLS without frontal plane mvmt >30 seconds to increase ease with ambulation L SLS 30'' none-minimal frontal plane instability  Pt reports pain in knee w/ prolonged ambulation [x] yes  [x] no   Long term goal 5: The pt will be able to demo x5 normalized squats with equal wbing in order to lift grandchild and objects from ground without pain or limitations Pt reports increased pain to 7/10 w/ squats that decreases immediately once back in upright position [] yes  [x] no      Body structures, Functions, Activity limitations: Decreased functional mobility , Decreased ADL status, Decreased ROM, Decreased strength, Increased pain, Decreased posture, Decreased balance, Decreased high-level IADLs  Assessment: Pt demo's some progress towards goals since beginning PT. Pt requesting early D/C d/t financial implications w/ high co-pay. Pt demo's good knowledge of HEP and progressions and is encouraged to continue independently until follow up w/ MD. D/C further PT at this time. Prognosis: Good, Excellent  Discharge Recommendations: Continue to assess pending progress    PLAN:   Plan  Plan Comment: Pt requesting to D/C d/t high co-pay, will cont exercises at home indep to self-manage sx's. Patient Status:[] Continue/ Initiate plan of Care    [x] Discharge PT. Recommend pt continue with HEP. [] Additional visits requested, Please re-certify for additional visits:        Signature: Electronically signed by Benedicto Garcia PT on 7/6/2021 at 11:10 AM  Objective Measures Obtained By: Electronically signed by Hilton Shirley PTA on 7/6/21 at 9:07 AM EDT    If you have any questions or concerns, please don't hesitate to call. Thank you for your referral.    I have reviewed this plan of care and certify a need for medically necessary rehabilitation services.     Physician Signature:__________________________________________________________  Date:  Please sign and return

## 2021-07-06 NOTE — PROGRESS NOTES
[address] [place] [facility]  Outpatient Physical Therapy    Treatment Note        Date: 2021  Patient: Valentina Bhat  : 1975  ACCT #: [de-identified]  Referring Practitioner: Karen Castro  Diagnosis: XR = Finding may reflect cartilage injury in the knee on the patella in the patellofemoral compartment  Treatment Diagnosis: L knee pain, decreased L>R LE strength, decreased L SLS stability, impaired fucntional activity tolerance, impaired L LE biomechanics, (+) L Thessaleys and McMurrays     Visit Information:  PT Visit Information  Onset Date: 21  PT Insurance Information: [facility]  Total # of Visits to Date: 3  No Show: 0  Canceled Appointment: 0  Progress Note Counter: 3/8-    Subjective: Pt reports going into the basement before session and it was painful, but not as bad as it's been.   Comments: RTD next month  HEP Compliance:  [x] Good [] Fair [] Poor [] Reports not doing due to:    Vital Signs  Patient Currently in Pain: Denies   Pain Screening  Patient Currently in Pain: Denies    OBJECTIVE:   Exercises  Exercise 8: 4'' step ups with yellow tband abd to avoid IR x10 F/L  Exercise 9: mini squats with yellow tband abd to avoid IR x10  Exercise 10: 4 way hip with YTB x10 ea b/l  Exercise 11: monster walks YTB F/L 10 steps fwd and back  Exercise 12: glut med wall press 5''x10 b/l  Exercise 20: HEP: glut med press, squats with band, step ups with band, clams, SLS, monster walks, 4-way hip    Balance  Single Leg Stance R Le  Single Leg Stance L Le  Comments: none-minimal frontal plane instability    Strength: [] NT  [x] MMT completed:  Strength RLE  Comment: 4+/5 Hip flex, knee, Hip ext, hip add 5/5 Hip IR, ER, abd, ankle DF  Strength LLE  Comment: 4+/5 hip flex, IR, ER, abd, knee flex, hip ext, hip add 5/5 ankle DF (min increase in pain w/ knee ext, hip IR/ER)     Strength Other  Other: L Single leg squat x5 w/ min valgus and instability, no pain    ROM: [x] NT  [] ROM description same as pre-treatment unless indicated. Action: [] NA   [x] Perform HEP  [] Meds as prescribed  [] Modalities as prescribed   [] Call Physician     Frequency/Duration:  Plan  Plan Comment: Pt requesting to D/C d/t high co-pay, will cont exercises at home indep to self-manage sx's. Pt to continue current HEP. See objective section for any therapeutic exercise changes, additions or modifications this date.     PT Individual Minutes  Time In: 7884  Time Out: 0848  Minutes: 38  Timed Code Treatment Minutes: 38 Minutes  Procedure Minutes: 0     Timed Activity Minutes Units   Ther Ex 38 3     Signature:  Electronically signed by Amber Ware PTA on 7/6/21 at 8:09 AM EDT

## 2021-07-07 ENCOUNTER — APPOINTMENT (OUTPATIENT)
Dept: PHYSICAL THERAPY | Age: 46
End: 2021-07-07
Payer: COMMERCIAL

## 2021-07-14 ENCOUNTER — OFFICE VISIT (OUTPATIENT)
Dept: FAMILY MEDICINE CLINIC | Age: 46
End: 2021-07-14
Payer: COMMERCIAL

## 2021-07-14 VITALS
SYSTOLIC BLOOD PRESSURE: 124 MMHG | BODY MASS INDEX: 36.11 KG/M2 | WEIGHT: 203.8 LBS | HEIGHT: 63 IN | DIASTOLIC BLOOD PRESSURE: 80 MMHG | HEART RATE: 95 BPM | OXYGEN SATURATION: 97 % | TEMPERATURE: 97.8 F

## 2021-07-14 DIAGNOSIS — M25.569 CHRONIC KNEE PAIN, UNSPECIFIED LATERALITY: Primary | ICD-10-CM

## 2021-07-14 DIAGNOSIS — G89.29 CHRONIC KNEE PAIN, UNSPECIFIED LATERALITY: Primary | ICD-10-CM

## 2021-07-14 DIAGNOSIS — J34.89 NASAL LESION: ICD-10-CM

## 2021-07-14 DIAGNOSIS — E66.9 OBESITY (BMI 35.0-39.9 WITHOUT COMORBIDITY): ICD-10-CM

## 2021-07-14 PROCEDURE — 1036F TOBACCO NON-USER: CPT | Performed by: FAMILY MEDICINE

## 2021-07-14 PROCEDURE — G8428 CUR MEDS NOT DOCUMENT: HCPCS | Performed by: FAMILY MEDICINE

## 2021-07-14 PROCEDURE — 99214 OFFICE O/P EST MOD 30 MIN: CPT | Performed by: FAMILY MEDICINE

## 2021-07-14 PROCEDURE — G8417 CALC BMI ABV UP PARAM F/U: HCPCS | Performed by: FAMILY MEDICINE

## 2021-07-14 RX ORDER — PHENTERMINE HYDROCHLORIDE 37.5 MG/1
37.5 TABLET ORAL
Qty: 30 TABLET | Refills: 0 | Status: SHIPPED | OUTPATIENT
Start: 2021-07-14 | End: 2021-08-11 | Stop reason: SDUPTHER

## 2021-07-14 RX ORDER — MOMETASONE FUROATE 1 MG/G
CREAM TOPICAL
Qty: 15 G | Refills: 1 | Status: SHIPPED | OUTPATIENT
Start: 2021-07-14 | End: 2021-12-01 | Stop reason: SDUPTHER

## 2021-07-14 NOTE — PROGRESS NOTES
Chief Complaint   Patient presents with    1 Month Follow-Up    Weight Management     Has completed 1 mo. of Adipex. No concerns.  Other     Would like to have her nose piercing looked at. Thinks maybe her body is rejecting the metal. Had done a couple of months ago & it is still swollen & painful. HPI: Eleni Borrego 48 y.o. female presenting for     Left Knee pain    Eleni Borrego is a 48 y.o. female who presents with knee pain involving the left knee. Onset was yearss ago. Worse with going up steps. Admits to crepitus. Denies any injury to the knee. TENS unit which did help but reports that the pain came back days later. Has not been to PT. Has not had xrays of the knee. Has recently increased her physical activity (does kickboxing). This does aggravate her pain. F/u   Reports that her symptoms have improved with PT  Was unable to afford the rest of the PT sessions   Was given more printouts to help   Goes exercising 3 times a week. Obesity   Patient reports since April she has gained about 19 pounds. Patilarry was keto diet. Reports she lost several pounds but not significant. She was able at one point to get down to 197 pounds. Patient tried restriction and avoid late night eating. Patient has been eating more fruits, chicken. Patient reports that years ago she was on adipex. At that time she lost 90 pounds. Denies any side effects on the medication. Devi would like to have a level. F/u   Doing well has lost 13 pounds   Admits to some dehydration but nothing else   Denies any fever chills, nausea, vomiting chest pain, shortness of breath     Possible allergic reaction   Got a nos ering in April   Admits to redness and some white discharge from the area   Tried salt water rinses and cleaning with dove soap but symptoms have not improved.             Current Outpatient Medications   Medication Sig Dispense Refill    phentermine (ADIPEX-P) 37.5 MG tablet Take 1 tablet by mouth every morning (before breakfast) for 30 days. 30 tablet 0    mometasone (ELOCON) 0.1 % cream Apply topically daily. 15 g 1    mupirocin (BACTROBAN) 2 % ointment Apply 2 times daily for 7 days. 15 g 0    APRI 0.15-30 MG-MCG per tablet       valACYclovir (VALTREX) 500 MG tablet TAKE 1 TABLET BY MOUTH 2 TIMES DAILY FOR 10 DAYS 20 tablet 1    albuterol sulfate  (90 Base) MCG/ACT inhaler Inhale 2 puffs into the lungs every 4 hours as needed for Wheezing or Shortness of Breath 1 Inhaler 1    SUMAtriptan (IMITREX) 50 MG tablet Take 1 tablet by mouth once as needed for Migraine 9 tablet 3     No current facility-administered medications for this visit. ROS  CONSTITUTIONAL: The patient denies fevers, chills, sweats and body ache. Admits to weight loss    HEENT: Denies headache, blurry vision, eye pain, tinnitus, vertigo,  sore throat, neck or thyroid masses. RESPIRATORY: Denies cough, sputum, hemoptysis. CARDIAC: Denies chest pain, pressure, palpitations, Denies lower extremity edema. GASTROINTESTINAL: Denies abdominal pain, constipation, diarrhea, bleeding in the stools,   GENITOURINARY: Denies dysuria, hematuria, nocturia or frequency, urinary incontinence. NEUROLOGIC: Denies headaches, dizziness, syncope, weakness  MUSCULOSKELETAL: admits to left knee pain. ENDOCRINOLOGY: Denies heat or cold intolerance. HEMATOLOGY: Denies easy bleeding or blood transfusion,anemia  DERMATOLOGY: Denies changes in moles or pigmentation changes. PSYCHIATRY: Denies depression, agitation or anxiety.     Past Medical History:   Diagnosis Date    Anemia     Asthma     Migraines     Staph infection     -hx staph colonization        Past Surgical History:   Procedure Laterality Date    ABDOMEN SURGERY      mass removed     SECTION          Family History   Problem Relation Age of Onset    Hypertension Mother     Asthma Mother     Hypertension Father     Heart Disease Father  Stomach Cancer Father     Emphysema Father     Breast Cancer Maternal Grandmother     Breast Cancer Maternal Aunt     Breast Cancer Paternal Cousin         Social History     Socioeconomic History    Marital status:      Spouse name: Not on file    Number of children: Not on file    Years of education: Not on file    Highest education level: Not on file   Occupational History    Not on file   Tobacco Use    Smoking status: Never Smoker    Smokeless tobacco: Never Used   Substance and Sexual Activity    Alcohol use: Yes     Alcohol/week: 0.0 standard drinks    Drug use: No    Sexual activity: Not on file   Other Topics Concern    Not on file   Social History Narrative    Not on file     Social Determinants of Health     Financial Resource Strain: Low Risk     Difficulty of Paying Living Expenses: Not hard at all   Food Insecurity: No Food Insecurity    Worried About Running Out of Food in the Last Year: Never true    Santa of Food in the Last Year: Never true   Transportation Needs: No Transportation Needs    Lack of Transportation (Medical): No    Lack of Transportation (Non-Medical):  No   Physical Activity:     Days of Exercise per Week:     Minutes of Exercise per Session:    Stress:     Feeling of Stress :    Social Connections:     Frequency of Communication with Friends and Family:     Frequency of Social Gatherings with Friends and Family:     Attends Islam Services:     Active Member of Clubs or Organizations:     Attends Club or Organization Meetings:     Marital Status:    Intimate Partner Violence:     Fear of Current or Ex-Partner:     Emotionally Abused:     Physically Abused:     Sexually Abused:         /80   Pulse 95   Temp 97.8 °F (36.6 °C)   Ht 5' 3\" (1.6 m)   Wt 203 lb 12.8 oz (92.4 kg)   LMP 06/18/2021   SpO2 97%   BMI 36.10 kg/m²        Physical Exam:    General appearance - alert, well appearing, and in no distress  Mental Status - alert, oriented to person, place, and time  Eyes - pupils equal and reactive, extraocular eye movements intact   Ears - bilateral TM's and external ear canals normal   Nose - normal and patent, no erythema, discharge or polyps   Sinuses - Normal paranasal sinuses without tenderness   Throat - mucous membranes moist, pharynx normal without lesions   Neck - supple, no significant adenopathy   Thyroid - thyroid is normal in size without nodules or tenderness    Chest - clear to auscultation, no wheezes, rales or rhonchi, symmetric air entry   Heart - normal rate, regular rhythm, normal S1, S2, no murmurs, rubs, clicks or gallops  Abdomen - soft, nontender, nondistended, no masses or organomegaly   Back exam - full range of motion, no tenderness, palpable spasm or pain on motion   Neurological - alert, oriented, normal speech, no focal findings or movement disorder noted   Musculoskeletal - grind test positive in the left knee. Pain with extension at the knee against resistance. Extremities - peripheral pulses normal, no pedal edema, no clubbing or cyanosis   Skin - on the left nare there is erythema. No tenderness. No signs of drainage at this time.        Labs   TSH   Date Value Ref Range Status   06/16/2021 2.380 0.440 - 3.860 uIU/mL Final   10/27/2017 2.720 0.270 - 4.200 uIU/mL Final     TSH   Date Value Ref Range Status   11/25/2014 2.620 0.270 - 4.200 uIU/mL Final   04/12/2013 1.926 0.550 - 4.780 uIU/mL Final     Lab Results   Component Value Date     10/27/2017    K 5.3 (H) 10/27/2017     10/27/2017    CO2 22 10/27/2017    BUN 21 (H) 10/27/2017    CREATININE 0.73 10/27/2017    GLUCOSE 81 10/27/2017    CALCIUM 9.2 10/27/2017    PROT 7.2 10/27/2017    LABALBU 4.0 10/27/2017    BILITOT 0.3 10/27/2017    ALKPHOS 48 10/27/2017    AST 19 10/27/2017    ALT 17 10/27/2017    LABGLOM >60.0 10/27/2017    GFRAA >60.0 10/27/2017    GLOB 3.2 10/27/2017     Lab Results   Component Value Date    WBC 4.3 (L) 12/19/2017    HGB 10.9 (L) 12/19/2017    HCT 35.7 (L) 12/19/2017    MCV 72.1 (L) 12/19/2017     12/19/2017     Lab Results   Component Value Date    LABA1C 5.3 04/27/2019     No results found for: EAG      A/P: Valentina Bhat 48 y.o. female presenting for    1. Obesity (BMI 35.0-39.9 without comorbidity)  - phentermine (ADIPEX-P) 37.5 MG tablet; Take 1 tablet by mouth every morning (before breakfast) for 30 days. Dispense: 30 tablet; Refill: 0    2. Chronic knee pain, unspecified laterality  Improving. Continue with PT exercises     3. Nasal lesion  Cover for infection  Use different nose ring.   - mupirocin (BACTROBAN) 2 % ointment; Apply 2 times daily for 7 days. Dispense: 15 g; Refill: 0          Please note, this report has been partially produced using speech recognition software  and may cause  and /or contain errors related to that system including grammar, punctuation and spelling as well as words and phrases that may seem inappropriate. If there are questions or concerns please feel free to contact me to clarify.

## 2021-08-11 ENCOUNTER — OFFICE VISIT (OUTPATIENT)
Dept: FAMILY MEDICINE CLINIC | Age: 46
End: 2021-08-11
Payer: COMMERCIAL

## 2021-08-11 VITALS
DIASTOLIC BLOOD PRESSURE: 72 MMHG | TEMPERATURE: 97.7 F | HEIGHT: 63 IN | BODY MASS INDEX: 34.94 KG/M2 | SYSTOLIC BLOOD PRESSURE: 130 MMHG | HEART RATE: 82 BPM | WEIGHT: 197.2 LBS | OXYGEN SATURATION: 97 %

## 2021-08-11 DIAGNOSIS — Z12.11 COLON CANCER SCREENING: Primary | ICD-10-CM

## 2021-08-11 DIAGNOSIS — E66.9 OBESITY (BMI 35.0-39.9 WITHOUT COMORBIDITY): ICD-10-CM

## 2021-08-11 PROBLEM — D50.9 IRON DEFICIENCY ANEMIA: Status: ACTIVE | Noted: 2021-08-11

## 2021-08-11 PROBLEM — J45.20 MILD INTERMITTENT ASTHMA: Status: ACTIVE | Noted: 2021-08-11

## 2021-08-11 PROCEDURE — 1036F TOBACCO NON-USER: CPT | Performed by: FAMILY MEDICINE

## 2021-08-11 PROCEDURE — G8427 DOCREV CUR MEDS BY ELIG CLIN: HCPCS | Performed by: FAMILY MEDICINE

## 2021-08-11 PROCEDURE — 99213 OFFICE O/P EST LOW 20 MIN: CPT | Performed by: FAMILY MEDICINE

## 2021-08-11 PROCEDURE — G8417 CALC BMI ABV UP PARAM F/U: HCPCS | Performed by: FAMILY MEDICINE

## 2021-08-11 RX ORDER — PHENTERMINE HYDROCHLORIDE 37.5 MG/1
37.5 TABLET ORAL
Qty: 30 TABLET | Refills: 0 | Status: SHIPPED | OUTPATIENT
Start: 2021-08-11 | End: 2021-09-10

## 2021-08-11 NOTE — PROGRESS NOTES
Chief Complaint   Patient presents with    Follow-up     on the adipex that was given last month         HPI: Abi Bellamy 48 y.o. female presenting for     Left Knee pain    Abi Bellamy is a 48 y.o. female who presents with knee pain involving the left knee. Onset was yearss ago. Worse with going up steps. Admits to crepitus. Denies any injury to the knee. TENS unit which did help but reports that the pain came back days later. Has not been to PT. Has not had xrays of the knee. Has recently increased her physical activity (does kickboxing). This does aggravate her pain. F/u   Reports that her symptoms have improved with PT  Was unable to afford the rest of the PT sessions   Was given more printouts to help   Goes exercising 3 times a week. F/u   Has improved   Feels like its a combo of exercises and weight loss. Obesity   Patient reports since April she has gained about 19 pounds. Pat[place] was keto diet. Reports she lost several pounds but not significant. She was able at one point to get down to 197 pounds. Patient tried restriction and avoid late night eating. Patient has been eating more fruits, chicken. Patient reports that years ago she was on adipex. At that time she lost 90 pounds. Denies any side effects on the medication. Pat[place] would like to have a level. F/u   Doing well has lost 13 pounds   Admits to some dehydration but nothing else   Denies any fever chills, nausea, vomiting chest pain, shortness of breath     F/u   Feels like she is losing inches   Diet is a 1200 mitul diet. No side effects from the medication.           Possible allergic reaction   Got a nose earring in April   Admits to redness and some white discharge from the area   Tried salt water rinses and cleaning with dove soap but symptoms have not improved. F/u   Symptoms have improved with the topical antibiotic. Patient also uses oils to help.   No issues or concerns at this time.    Current Outpatient Medications   Medication Sig Dispense Refill    phentermine (ADIPEX-P) 37.5 MG tablet Take 1 tablet by mouth every morning (before breakfast) for 30 days. 30 tablet 0    mometasone (ELOCON) 0.1 % cream Apply topically daily. 15 g 1    APRI 0.15-30 MG-MCG per tablet       valACYclovir (VALTREX) 500 MG tablet TAKE 1 TABLET BY MOUTH 2 TIMES DAILY FOR 10 DAYS (Patient not taking: Reported on 8/11/2021) 20 tablet 1    albuterol sulfate  (90 Base) MCG/ACT inhaler Inhale 2 puffs into the lungs every 4 hours as needed for Wheezing or Shortness of Breath (Patient not taking: Reported on 8/11/2021) 1 Inhaler 1    SUMAtriptan (IMITREX) 50 MG tablet Take 1 tablet by mouth once as needed for Migraine (Patient not taking: Reported on 8/11/2021) 9 tablet 3     No current facility-administered medications for this visit. ROS  CONSTITUTIONAL: The patient denies fevers, chills, sweats and body ache. Admits to weight loss    HEENT: Denies headache, blurry vision, eye pain, tinnitus, vertigo,  sore throat, neck or thyroid masses. RESPIRATORY: Denies cough, sputum, hemoptysis. CARDIAC: Denies chest pain, pressure, palpitations, Denies lower extremity edema. GASTROINTESTINAL: Denies abdominal pain, constipation, diarrhea, bleeding in the stools,   GENITOURINARY: Denies dysuria, hematuria, nocturia or frequency, urinary incontinence. NEUROLOGIC: Denies headaches, dizziness, syncope, weakness  MUSCULOSKELETAL: admits to left knee pain. ENDOCRINOLOGY: Denies heat or cold intolerance. HEMATOLOGY: Denies easy bleeding or blood transfusion,anemia  DERMATOLOGY: Denies changes in moles or pigmentation changes. PSYCHIATRY: Denies depression, agitation or anxiety.     Past Medical History:   Diagnosis Date    Anemia     Asthma     Migraines     Staph infection     2011-hx staph colonization        Past Surgical History:   Procedure Laterality Date    ABDOMEN SURGERY  1997    mass removed   SECTION          Family History   Problem Relation Age of Onset    Hypertension Mother     Asthma Mother     Hypertension Father     Heart Disease Father     Stomach Cancer Father     Emphysema Father     Breast Cancer Maternal Grandmother     Breast Cancer Maternal Aunt     Breast Cancer Paternal Cousin         Social History     Socioeconomic History    Marital status:      Spouse name: Not on file    Number of children: Not on file    Years of education: Not on file    Highest education level: Not on file   Occupational History    Not on file   Tobacco Use    Smoking status: Never Smoker    Smokeless tobacco: Never Used   Substance and Sexual Activity    Alcohol use: Yes     Alcohol/week: 0.0 standard drinks    Drug use: No    Sexual activity: Not on file   Other Topics Concern    Not on file   Social History Narrative    Not on file     Social Determinants of Health     Financial Resource Strain: Low Risk     Difficulty of Paying Living Expenses: Not hard at all   Food Insecurity: No Food Insecurity    Worried About Running Out of Food in the Last Year: Never true    Santa of Food in the Last Year: Never true   Transportation Needs: No Transportation Needs    Lack of Transportation (Medical): No    Lack of Transportation (Non-Medical):  No   Physical Activity:     Days of Exercise per Week:     Minutes of Exercise per Session:    Stress:     Feeling of Stress :    Social Connections:     Frequency of Communication with Friends and Family:     Frequency of Social Gatherings with Friends and Family:     Attends [employer] Services:     Active Member of Clubs or Organizations:     Attends Club or Organization Meetings:     Marital Status:    Intimate Partner Violence:     Fear of Current or Ex-Partner:     Emotionally Abused:     Physically Abused:     Sexually Abused:         /72 (Site: Left Upper Arm, Position: Sitting, Cuff Size: Large Adult) Pulse 82   Temp 97.7 °F (36.5 °C) (Temporal)   Ht 5' 3\" (1.6 m)   Wt 197 lb 3.2 oz (89.4 kg)   SpO2 97%   Breastfeeding No   BMI 34.93 kg/m²        Physical Exam:    General appearance - alert, well appearing, and in no distress  Mental Status - alert, oriented to person, place, and time  Eyes - pupils equal and reactive, extraocular eye movements intact   Ears - bilateral TM's and external ear canals normal   Nose - normal and patent, no erythema, discharge or polyps   Sinuses - Normal paranasal sinuses without tenderness   Throat - mucous membranes moist, pharynx normal without lesions   Neck - supple, no significant adenopathy   Thyroid - thyroid is normal in size without nodules or tenderness    Chest - clear to auscultation, no wheezes, rales or rhonchi, symmetric air entry   Heart - normal rate, regular rhythm, normal S1, S2, no murmurs, rubs, clicks or gallops  Abdomen - soft, nontender, nondistended, no masses or organomegaly   Back exam - full range of motion, no tenderness, palpable spasm or pain on motion   Neurological - alert, oriented, normal speech, no focal findings or movement disorder noted   Musculoskeletal -negative exam.  Extremities - peripheral pulses normal, no pedal edema, no clubbing or cyanosis   Skin -left there is improved substantially. No issues or concerns. No tenderness to palpation.       Labs   TSH   Date Value Ref Range Status   06/16/2021 2.380 0.440 - 3.860 uIU/mL Final   10/27/2017 2.720 0.270 - 4.200 uIU/mL Final     TSH   Date Value Ref Range Status   11/25/2014 2.620 0.270 - 4.200 uIU/mL Final   04/12/2013 1.926 0.550 - 4.780 uIU/mL Final     Lab Results   Component Value Date     10/27/2017    K 5.3 (H) 10/27/2017     10/27/2017    CO2 22 10/27/2017    BUN 21 (H) 10/27/2017    CREATININE 0.73 10/27/2017    GLUCOSE 81 10/27/2017    CALCIUM 9.2 10/27/2017    PROT 7.2 10/27/2017    LABALBU 4.0 10/27/2017    BILITOT 0.3 10/27/2017    ALKPHOS 48 10/27/2017 AST 19 10/27/2017    ALT 17 10/27/2017    LABGLOM >60.0 10/27/2017    GFRAA >60.0 10/27/2017    GLOB 3.2 10/27/2017     Lab Results   Component Value Date    WBC 4.3 (L) 12/19/2017    HGB 10.9 (L) 12/19/2017    HCT 35.7 (L) 12/19/2017    MCV 72.1 (L) 12/19/2017     12/19/2017     Lab Results   Component Value Date    LABA1C 5.3 04/27/2019     No results found for: EAG      A/P: Ebb Span 48 y.o. female presenting for    1. Obesity (BMI 35.0-39.9 without comorbidity)  OARRS reviewed. Patient on the last month. Continue monitor. Patient will come back in 1 month to see how she is doing overall. We discussed other agents to help with weight loss. - phentermine (ADIPEX-P) 37.5 MG tablet; Take 1 tablet by mouth every morning (before breakfast) for 30 days. Dispense: 30 tablet; Refill: 0    2. Chronic knee pain, unspecified laterality  Resolved. 3. Nasal lesion  Has resolved. Bactroban helps. Please note, this report has been partially produced using speech recognition software  and may cause  and /or contain errors related to that system including grammar, punctuation and spelling as well as words and phrases that may seem inappropriate. If there are questions or concerns please feel free to contact me to clarify.

## 2021-12-01 ENCOUNTER — OFFICE VISIT (OUTPATIENT)
Dept: FAMILY MEDICINE CLINIC | Age: 46
End: 2021-12-01
Payer: COMMERCIAL

## 2021-12-01 VITALS
WEIGHT: 208.2 LBS | HEART RATE: 76 BPM | TEMPERATURE: 97.4 F | OXYGEN SATURATION: 100 % | HEIGHT: 63 IN | SYSTOLIC BLOOD PRESSURE: 132 MMHG | BODY MASS INDEX: 36.89 KG/M2 | DIASTOLIC BLOOD PRESSURE: 84 MMHG

## 2021-12-01 DIAGNOSIS — L30.9 DERMATITIS: ICD-10-CM

## 2021-12-01 DIAGNOSIS — B00.9 HSV (HERPES SIMPLEX VIRUS) INFECTION: ICD-10-CM

## 2021-12-01 DIAGNOSIS — R30.0 DYSURIA: ICD-10-CM

## 2021-12-01 DIAGNOSIS — R30.0 DYSURIA: Primary | ICD-10-CM

## 2021-12-01 PROCEDURE — 1036F TOBACCO NON-USER: CPT | Performed by: FAMILY MEDICINE

## 2021-12-01 PROCEDURE — G8484 FLU IMMUNIZE NO ADMIN: HCPCS | Performed by: FAMILY MEDICINE

## 2021-12-01 PROCEDURE — G8427 DOCREV CUR MEDS BY ELIG CLIN: HCPCS | Performed by: FAMILY MEDICINE

## 2021-12-01 PROCEDURE — 81003 URINALYSIS AUTO W/O SCOPE: CPT | Performed by: FAMILY MEDICINE

## 2021-12-01 PROCEDURE — G8417 CALC BMI ABV UP PARAM F/U: HCPCS | Performed by: FAMILY MEDICINE

## 2021-12-01 PROCEDURE — 99213 OFFICE O/P EST LOW 20 MIN: CPT | Performed by: FAMILY MEDICINE

## 2021-12-01 RX ORDER — FLUCONAZOLE 150 MG/1
150 TABLET ORAL
Qty: 2 TABLET | Refills: 0 | Status: SHIPPED | OUTPATIENT
Start: 2021-12-01 | End: 2021-12-07

## 2021-12-01 RX ORDER — MOMETASONE FUROATE 1 MG/G
CREAM TOPICAL
Qty: 15 G | Refills: 1 | Status: SHIPPED | OUTPATIENT
Start: 2021-12-01

## 2021-12-01 RX ORDER — VALACYCLOVIR HYDROCHLORIDE 500 MG/1
TABLET, FILM COATED ORAL
Qty: 20 TABLET | Refills: 1 | Status: SHIPPED | OUTPATIENT
Start: 2021-12-01 | End: 2021-12-01

## 2021-12-01 RX ORDER — NITROFURANTOIN 25; 75 MG/1; MG/1
100 CAPSULE ORAL 2 TIMES DAILY
Qty: 10 CAPSULE | Refills: 0 | Status: SHIPPED | OUTPATIENT
Start: 2021-12-01 | End: 2021-12-06

## 2021-12-01 RX ORDER — VALACYCLOVIR HYDROCHLORIDE 500 MG/1
500 TABLET, FILM COATED ORAL DAILY
Qty: 90 TABLET | Refills: 1 | Status: SHIPPED | OUTPATIENT
Start: 2021-12-01 | End: 2022-10-18 | Stop reason: SDUPTHER

## 2021-12-01 NOTE — PROGRESS NOTES
Chief Complaint   Patient presents with    Urinary Tract Infection     Sx; lower back pain, foul odor, urine is cloudy.  Health Maintenance     No to flu vaccine. No to colonoscopy/cologuard/fit due to insurance. HPI: Dina Navarrete 48 y.o. female presenting for     Genital herpes  She has a history of genital herpes. Was on chronic Valtrex that she took once daily. Needs a refill. Denies any outbreaks currently. Dysuria   Patient is complaining of low back pain and cloudy urine   Going on for several days   Patient reports that she was given amoxicillin (initially for sinusitis) for it but did not resolve. Menorrhagia   Takes OCP to help with regulate her periods   Denies any issues with the medication. Cycles are regular.      F/u   Recently took herself off of the medication   Would like to conceive naturally. Devi had regular menstrual cycles until she got off of the medication   We will like to hold off with a gynecology referral.  Current Outpatient Medications   Medication Sig Dispense Refill    mometasone (ELOCON) 0.1 % cream Apply topically daily. 15 g 1    valACYclovir (VALTREX) 500 MG tablet TAKE 1 TABLET BY MOUTH 2 TIMES DAILY FOR 10 DAYS 20 tablet 1    APRI 0.15-30 MG-MCG per tablet  (Patient not taking: Reported on 12/1/2021)      albuterol sulfate  (90 Base) MCG/ACT inhaler Inhale 2 puffs into the lungs every 4 hours as needed for Wheezing or Shortness of Breath (Patient not taking: Reported on 8/11/2021) 1 Inhaler 1    SUMAtriptan (IMITREX) 50 MG tablet Take 1 tablet by mouth once as needed for Migraine (Patient not taking: Reported on 8/11/2021) 9 tablet 3     No current facility-administered medications for this visit. ROS  CONSTITUTIONAL: The patient denies fevers, chills, sweats and body ache. Admits to weight loss    HEENT: Denies headache, blurry vision, eye pain, tinnitus, vertigo,  sore throat, neck or thyroid masses.   RESPIRATORY: Denies cough, sputum, hemoptysis. CARDIAC: Denies chest pain, pressure, palpitations, Denies lower extremity edema. GASTROINTESTINAL: Denies abdominal pain, constipation, diarrhea, bleeding in the stools,   GENITOURINARY: admits to foul odor in urine, cloudy urine and dysuria, hematuria, nocturia or frequency, urinary incontinence. NEUROLOGIC: Denies headaches, dizziness, syncope, weakness  MUSCULOSKELETAL: Knee pain is resolved. ENDOCRINOLOGY: Denies heat or cold intolerance. HEMATOLOGY: Denies easy bleeding or blood transfusion,anemia  DERMATOLOGY: Denies changes in moles or pigmentation changes. PSYCHIATRY: Denies depression, agitation or anxiety. Past Medical History:   Diagnosis Date    Anemia     Asthma     Migraines     Staph infection     -hx staph colonization        Past Surgical History:   Procedure Laterality Date    ABDOMEN SURGERY      mass removed     SECTION          Family History   Problem Relation Age of Onset    Hypertension Mother     Asthma Mother     Hypertension Father     Heart Disease Father     Stomach Cancer Father     Emphysema Father     Breast Cancer Maternal Grandmother     Breast Cancer Maternal Aunt     Breast Cancer Paternal Cousin         Social History     Socioeconomic History    Marital status:      Spouse name: Not on file    Number of children: Not on file    Years of education: Not on file    Highest education level: Not on file   Occupational History    Not on file   Tobacco Use    Smoking status: Never Smoker    Smokeless tobacco: Never Used   Substance and Sexual Activity    Alcohol use:  Yes     Alcohol/week: 0.0 standard drinks    Drug use: No    Sexual activity: Not on file   Other Topics Concern    Not on file   Social History Narrative    Not on file     Social Determinants of Health     Financial Resource Strain: Low Risk     Difficulty of Paying Living Expenses: Not hard at all   Food Insecurity: No Food Insecurity    Worried About Running Out of Food in the Last Year: Never true    Santa of Food in the Last Year: Never true   Transportation Needs: No Transportation Needs    Lack of Transportation (Medical): No    Lack of Transportation (Non-Medical):  No   Physical Activity:     Days of Exercise per Week: Not on file    Minutes of Exercise per Session: Not on file   Stress:     Feeling of Stress : Not on file   Social Connections:     Frequency of Communication with Friends and Family: Not on file    Frequency of Social Gatherings with Friends and Family: Not on file    Attends [employer] Services: Not on file    Active Member of [address] [place] [address] [employer] or Organizations: Not on file    Attends Club or Organization Meetings: Not on file    Marital Status: Not on file   Intimate Partner Violence:     Fear of Current or Ex-Partner: Not on file    Emotionally Abused: Not on file    Physically Abused: Not on file    Sexually Abused: Not on file   Housing Stability:     Unable to Pay for Housing in the Last Year: Not on file    Number of [place] in the Last Year: Not on file    Unstable Housing in the Last Year: Not on file        /84   Pulse 76   Temp 97.4 °F (36.3 °C) (Temporal)   Ht 5' 3\" (1.6 m)   Wt 208 lb 3.2 oz (94.4 kg)   SpO2 100%   BMI 36.88 kg/m²        Physical Exam:    General appearance - alert, well appearing, and in no distress  Mental Status - alert, oriented to person, place, and time  Eyes - pupils equal and reactive, extraocular eye movements intact   Ears - bilateral TM's and external ear canals normal   Nose - normal and patent, no erythema, discharge or polyps   Sinuses - Normal paranasal sinuses without tenderness   Throat - mucous membranes moist, pharynx normal without lesions   Neck - supple, no significant adenopathy   Thyroid - thyroid is normal in size without nodules or tenderness    Chest - clear to auscultation, no wheezes, rales or rhonchi, symmetric air entry   Heart - normal rate, regular rhythm, normal S1, S2, no murmurs, rubs, clicks or gallops  Abdomen - soft, nontender, nondistended, no masses or organomegaly   Back exam - full range of motion, no tenderness, palpable spasm or pain on motion   Neurological - alert, oriented, normal speech, no focal findings or movement disorder noted   Musculoskeletal -negative exam.  Extremities - peripheral pulses normal, no pedal edema, no clubbing or cyanosis   Skin -left there is improved substantially. No issues or concerns. No tenderness to palpation. Labs   TSH   Date Value Ref Range Status   06/16/2021 2.380 0.440 - 3.860 uIU/mL Final   10/27/2017 2.720 0.270 - 4.200 uIU/mL Final     TSH   Date Value Ref Range Status   11/25/2014 2.620 0.270 - 4.200 uIU/mL Final   04/12/2013 1.926 0.550 - 4.780 uIU/mL Final     Lab Results   Component Value Date     10/27/2017    K 5.3 (H) 10/27/2017     10/27/2017    CO2 22 10/27/2017    BUN 21 (H) 10/27/2017    CREATININE 0.73 10/27/2017    GLUCOSE 81 10/27/2017    CALCIUM 9.2 10/27/2017    PROT 7.2 10/27/2017    LABALBU 4.0 10/27/2017    BILITOT 0.3 10/27/2017    ALKPHOS 48 10/27/2017    AST 19 10/27/2017    ALT 17 10/27/2017    LABGLOM >60.0 10/27/2017    GFRAA >60.0 10/27/2017    GLOB 3.2 10/27/2017     Lab Results   Component Value Date    WBC 4.3 (L) 12/19/2017    HGB 10.9 (L) 12/19/2017    HCT 35.7 (L) 12/19/2017    MCV 72.1 (L) 12/19/2017     12/19/2017     Lab Results   Component Value Date    LABA1C 5.3 04/27/2019     No results found for: EAG      A/P: rAtemio Davis 48 y.o. female presenting for    1. HSV (herpes simplex virus) infection    - valACYclovir (VALTREX) 500 MG tablet; Take 1 tablet by mouth daily TAKE 1 TABLET BY MOUTH 2 TIMES DAILY FOR 10 DAYS  Dispense: 90 tablet; Refill: 1    2. Dysuria    - POCT Urinalysis No Micro (Auto)  - Culture, Urine; Future  - nitrofurantoin, macrocrystal-monohydrate, (MACROBID) 100 MG capsule;  Take 1 capsule by mouth 2 times daily for 5 days  Dispense: 10 capsule; Refill: 0  - fluconazole (DIFLUCAN) 150 MG tablet; Take 1 tablet by mouth every 72 hours for 6 days  Dispense: 2 tablet; Refill: 0    3. Dermatitis    - mometasone (ELOCON) 0.1 % cream; Apply topically daily. Dispense: 15 g; Refill: 1        Please note, this report has been partially produced using speech recognition software  and may cause  and /or contain errors related to that system including grammar, punctuation and spelling as well as words and phrases that may seem inappropriate. If there are questions or concerns please feel free to contact me to clarify.

## 2021-12-04 LAB
ORGANISM: ABNORMAL
URINE CULTURE, ROUTINE: ABNORMAL

## 2021-12-29 RX ORDER — ALBUTEROL SULFATE 90 UG/1
2 AEROSOL, METERED RESPIRATORY (INHALATION) EVERY 4 HOURS PRN
Qty: 1 EACH | Refills: 3 | Status: SHIPPED | OUTPATIENT
Start: 2021-12-29

## 2022-05-27 ENCOUNTER — COMMUNITY OUTREACH (OUTPATIENT)
Dept: FAMILY MEDICINE CLINIC | Age: 47
End: 2022-05-27

## 2022-05-27 NOTE — PROGRESS NOTES
Patient's HM shows they are overdue for Colorectal Screening. [employer] and  files searched without success. No results to attach to order nor HM updated. Note added to upcoming appointment to discuss Care Gap.

## 2022-10-18 ENCOUNTER — OFFICE VISIT (OUTPATIENT)
Dept: FAMILY MEDICINE CLINIC | Age: 47
End: 2022-10-18
Payer: COMMERCIAL

## 2022-10-18 VITALS
WEIGHT: 208 LBS | OXYGEN SATURATION: 96 % | HEIGHT: 63 IN | DIASTOLIC BLOOD PRESSURE: 76 MMHG | SYSTOLIC BLOOD PRESSURE: 118 MMHG | TEMPERATURE: 97.8 F | HEART RATE: 84 BPM | BODY MASS INDEX: 36.86 KG/M2

## 2022-10-18 DIAGNOSIS — E55.9 VITAMIN D DEFICIENCY: ICD-10-CM

## 2022-10-18 DIAGNOSIS — N92.6 IRREGULAR PERIODS: ICD-10-CM

## 2022-10-18 DIAGNOSIS — Z13.1 DIABETES MELLITUS SCREENING: Primary | ICD-10-CM

## 2022-10-18 DIAGNOSIS — B00.9 HSV (HERPES SIMPLEX VIRUS) INFECTION: ICD-10-CM

## 2022-10-18 DIAGNOSIS — Z13.1 DIABETES MELLITUS SCREENING: ICD-10-CM

## 2022-10-18 LAB
BASOPHILS ABSOLUTE: 0 K/UL (ref 0–0.2)
BASOPHILS RELATIVE PERCENT: 0.7 %
EOSINOPHILS ABSOLUTE: 0.2 K/UL (ref 0–0.7)
EOSINOPHILS RELATIVE PERCENT: 3.3 %
HBA1C MFR BLD: 5.6 % (ref 4.8–5.9)
HCT VFR BLD CALC: 35.2 % (ref 37–47)
HEMOGLOBIN: 11.1 G/DL (ref 12–16)
LYMPHOCYTES ABSOLUTE: 1.7 K/UL (ref 1–4.8)
LYMPHOCYTES RELATIVE PERCENT: 24.7 %
MCH RBC QN AUTO: 26.1 PG (ref 27–31.3)
MCHC RBC AUTO-ENTMCNC: 31.4 % (ref 33–37)
MCV RBC AUTO: 83.2 FL (ref 79.4–94.8)
MONOCYTES ABSOLUTE: 0.6 K/UL (ref 0.2–0.8)
MONOCYTES RELATIVE PERCENT: 8.4 %
NEUTROPHILS ABSOLUTE: 4.4 K/UL (ref 1.4–6.5)
NEUTROPHILS RELATIVE PERCENT: 62.9 %
PDW BLD-RTO: 18.3 % (ref 11.5–14.5)
PLATELET # BLD: 300 K/UL (ref 130–400)
RBC # BLD: 4.23 M/UL (ref 4.2–5.4)
TSH REFLEX: 2.57 UIU/ML (ref 0.44–3.86)
WBC # BLD: 7 K/UL (ref 4.8–10.8)

## 2022-10-18 PROCEDURE — 99214 OFFICE O/P EST MOD 30 MIN: CPT | Performed by: FAMILY MEDICINE

## 2022-10-18 PROCEDURE — 1036F TOBACCO NON-USER: CPT | Performed by: FAMILY MEDICINE

## 2022-10-18 PROCEDURE — G8427 DOCREV CUR MEDS BY ELIG CLIN: HCPCS | Performed by: FAMILY MEDICINE

## 2022-10-18 PROCEDURE — G8417 CALC BMI ABV UP PARAM F/U: HCPCS | Performed by: FAMILY MEDICINE

## 2022-10-18 PROCEDURE — G8484 FLU IMMUNIZE NO ADMIN: HCPCS | Performed by: FAMILY MEDICINE

## 2022-10-18 RX ORDER — VALACYCLOVIR HYDROCHLORIDE 500 MG/1
TABLET, FILM COATED ORAL
Qty: 90 TABLET | Refills: 1 | Status: SHIPPED | OUTPATIENT
Start: 2022-10-18

## 2022-10-18 SDOH — ECONOMIC STABILITY: FOOD INSECURITY: WITHIN THE PAST 12 MONTHS, YOU WORRIED THAT YOUR FOOD WOULD RUN OUT BEFORE YOU GOT MONEY TO BUY MORE.: NEVER TRUE

## 2022-10-18 SDOH — ECONOMIC STABILITY: FOOD INSECURITY: WITHIN THE PAST 12 MONTHS, THE FOOD YOU BOUGHT JUST DIDN'T LAST AND YOU DIDN'T HAVE MONEY TO GET MORE.: NEVER TRUE

## 2022-10-18 ASSESSMENT — PATIENT HEALTH QUESTIONNAIRE - PHQ9
SUM OF ALL RESPONSES TO PHQ QUESTIONS 1-9: 0
2. FEELING DOWN, DEPRESSED OR HOPELESS: 0
SUM OF ALL RESPONSES TO PHQ9 QUESTIONS 1 & 2: 0
SUM OF ALL RESPONSES TO PHQ QUESTIONS 1-9: 0
SUM OF ALL RESPONSES TO PHQ QUESTIONS 1-9: 0
1. LITTLE INTEREST OR PLEASURE IN DOING THINGS: 0
SUM OF ALL RESPONSES TO PHQ QUESTIONS 1-9: 0

## 2022-10-18 ASSESSMENT — SOCIAL DETERMINANTS OF HEALTH (SDOH): HOW HARD IS IT FOR YOU TO PAY FOR THE VERY BASICS LIKE FOOD, HOUSING, MEDICAL CARE, AND HEATING?: NOT HARD AT ALL

## 2022-10-18 NOTE — PROGRESS NOTES
Chief Complaint   Patient presents with    Menstrual Problem     Pt wants to check hormone levels. Periods are on & off, was on period for a month, currently spotting. Started 9/21/22 to 10/15/22. HPI: Jhonatan Suarez 50 y.o. female presenting for             Genital herpes  She has a history of genital herpes. Was on chronic Valtrex that she took once daily. Needs a refill. Denies any outbreaks currently. Menorrhagia   Takes OCP to help with regulate her periods   Denies any issues with the medication. Cycles are regular. F/u   Recently took herself off of the medication   Would like to conceive naturally. Devi had regular menstrual cycles until she got off of the medication   We will like to hold off with a gynecology referral.    F/u   Has been off of OCP for about 1 year. would like to check her blood work. Periods are irregular. Just finished her cycle. Heavier than usual. Still interested in conceiving. Current Outpatient Medications   Medication Sig Dispense Refill    albuterol sulfate  (90 Base) MCG/ACT inhaler Inhale 2 puffs into the lungs every 4 hours as needed for Wheezing or Shortness of Breath 1 each 3    mometasone (ELOCON) 0.1 % cream Apply topically daily. 15 g 1    valACYclovir (VALTREX) 500 MG tablet Take 1 tablet by mouth daily TAKE 1 TABLET BY MOUTH 2 TIMES DAILY FOR 10 DAYS 90 tablet 1    APRI 0.15-30 MG-MCG per tablet  (Patient not taking: Reported on 12/1/2021)      SUMAtriptan (IMITREX) 50 MG tablet Take 1 tablet by mouth once as needed for Migraine (Patient not taking: Reported on 8/11/2021) 9 tablet 3     No current facility-administered medications for this visit. ROS  CONSTITUTIONAL: The patient denies fevers, chills, sweats and body ache. Admits to weight loss    HEENT: Denies headache, blurry vision, eye pain, tinnitus, vertigo,  sore throat, neck or thyroid masses. RESPIRATORY: Denies cough, sputum, hemoptysis.   CARDIAC: Denies chest pain, pressure, palpitations, Denies lower extremity edema. GASTROINTESTINAL: Denies abdominal pain, constipation, diarrhea, bleeding in the stools,   GENITOURINARY: admits to foul odor in urine, cloudy urine and dysuria, hematuria, nocturia or frequency, urinary incontinence. NEUROLOGIC: Denies headaches, dizziness, syncope, weakness  MUSCULOSKELETAL: Knee pain is resolved. ENDOCRINOLOGY: Denies heat or cold intolerance. HEMATOLOGY: Denies easy bleeding or blood transfusion,anemia  DERMATOLOGY: Denies changes in moles or pigmentation changes. PSYCHIATRY: Denies depression, agitation or anxiety. Past Medical History:   Diagnosis Date    Anemia     Asthma     Migraines     Staph infection     -hx staph colonization        Past Surgical History:   Procedure Laterality Date    ABDOMINAL SURGERY      mass removed     SECTION          Family History   Problem Relation Age of Onset    Hypertension Mother     Asthma Mother     Hypertension Father     Heart Disease Father     Stomach Cancer Father     Emphysema Father     Breast Cancer Maternal Grandmother     Breast Cancer Maternal Aunt     Breast Cancer Paternal Cousin         Social History     Socioeconomic History    Marital status:      Spouse name: Not on file    Number of children: Not on file    Years of education: Not on file    Highest education level: Not on file   Occupational History    Not on file   Tobacco Use    Smoking status: Never    Smokeless tobacco: Never   Substance and Sexual Activity    Alcohol use:  Yes     Alcohol/week: 0.0 standard drinks    Drug use: No    Sexual activity: Not on file   Other Topics Concern    Not on file   Social History Narrative    Not on file     Social Determinants of Health     Financial Resource Strain: Low Risk     Difficulty of Paying Living Expenses: Not hard at all   Food Insecurity: No Food Insecurity    Worried About [address] in the Last Year: Never true    Ran Out of Food in the Last Year: Never true   Transportation Needs: Not on file   Physical Activity: Not on file   Stress: Not on file   Social Connections: Not on file   Intimate Partner Violence: Not on file   Housing Stability: Not on file        /76   Pulse 84   Temp 97.8 °F (36.6 °C) (Temporal)   Ht 5' 3\" (1.6 m)   Wt 208 lb (94.3 kg)   SpO2 96%   BMI 36.85 kg/m²        Physical Exam:    General appearance - alert, well appearing, and in no distress, obese  Mental Status - alert, oriented to person, place, and time  Eyes - pupils equal and reactive, extraocular eye movements intact   Ears - bilateral TM's and external ear canals normal   Nose - normal and patent, no erythema, discharge or polyps   Sinuses - Normal paranasal sinuses without tenderness   Throat - mucous membranes moist, pharynx normal without lesions   Neck - supple, no significant adenopathy   Thyroid - thyroid is normal in size without nodules or tenderness    Chest - clear to auscultation, no wheezes, rales or rhonchi, symmetric air entry   Heart - normal rate, regular rhythm, normal S1, S2, no murmurs, rubs, clicks or gallops  Abdomen - soft, nontender, nondistended, no masses or organomegaly   Back exam - full range of motion, no tenderness, palpable spasm or pain on motion   Neurological - alert, oriented, normal speech, no focal findings or movement disorder noted   Musculoskeletal -negative exam.  Extremities - peripheral pulses normal, no pedal edema, no clubbing or cyanosis   Skin -left there is improved substantially. No issues or concerns. No tenderness to palpation.       Labs   TSH   Date Value Ref Range Status   06/16/2021 2.380 0.440 - 3.860 uIU/mL Final   10/27/2017 2.720 0.270 - 4.200 uIU/mL Final     TSH   Date Value Ref Range Status   11/25/2014 2.620 0.270 - 4.200 uIU/mL Final   04/12/2013 1.926 0.550 - 4.780 uIU/mL Final     Lab Results   Component Value Date     10/27/2017    K 5.3 (H) 10/27/2017     10/27/2017    CO2 22 10/27/2017    BUN 21 (H) 10/27/2017    CREATININE 0.73 10/27/2017    GLUCOSE 81 10/27/2017    CALCIUM 9.2 10/27/2017    PROT 7.2 10/27/2017    LABALBU 4.0 10/27/2017    BILITOT 0.3 10/27/2017    ALKPHOS 48 10/27/2017    AST 19 10/27/2017    ALT 17 10/27/2017    LABGLOM >60.0 10/27/2017    GFRAA >60.0 10/27/2017    GLOB 3.2 10/27/2017     Lab Results   Component Value Date    WBC 4.3 (L) 12/19/2017    HGB 10.9 (L) 12/19/2017    HCT 35.7 (L) 12/19/2017    MCV 72.1 (L) 12/19/2017     12/19/2017     Lab Results   Component Value Date    LABA1C 5.3 04/27/2019     No results found for: EAG      A/P: Ok Arm 50 y.o. female presenting for    1. Diabetes mellitus screening    - Hemoglobin A1C; Future    2. Irregular periods    - Follicle Stimulating Hormone; Future  - Luteinizing Hormone; Future  - CBC with Auto Differential; Future  - TSH with Reflex; Future  - Iron and TIBC; Future  - Ferritin; Future    3. Vitamin D deficiency    - Vitamin D 25 Hydroxy; Future    4. HSV (herpes simplex virus) infection    - valACYclovir (VALTREX) 500 MG tablet; TAKE 1 TABLET BY MOUTH DAILY  Dispense: 90 tablet; Refill: 1          Please note, this report has been partially produced using speech recognition software  and may cause  and /or contain errors related to that system including grammar, punctuation and spelling as well as words and phrases that may seem inappropriate. If there are questions or concerns please feel free to contact me to clarify.

## 2022-10-19 LAB
FERRITIN: 12 NG/ML (ref 13–150)
FOLLICLE STIMULATING HORMONE: 31.1 MIU/ML (ref 1.7–21.5)
IRON SATURATION: 6 % (ref 20–55)
IRON: 23 UG/DL (ref 37–145)
LH: 25.5 MIU/ML (ref 1–95.6)
TOTAL IRON BINDING CAPACITY: 357 UG/DL (ref 250–450)
UNSATURATED IRON BINDING CAPACITY: 334 UG/DL (ref 112–347)
VITAMIN D 25-HYDROXY: 20.7 NG/ML

## 2022-11-13 DIAGNOSIS — L30.9 DERMATITIS: ICD-10-CM

## 2022-11-13 NOTE — TELEPHONE ENCOUNTER
requesting medication refill.  Please approve or deny this request.    Rx requested:  Requested Prescriptions     Pending Prescriptions Disp Refills    mometasone (ELOCON) 0.1 % cream [Pharmacy Med Name: Mometasone Furoate External Cream 0.1 %] 15 g 0     Sig: APPLY TOPICALLY TO AFFECTED AREA(S) ONCE DAILY         Last Office Visit:   10/18/2022      Next Visit Date:  Future Appointments   Date Time Provider Nica Rodríguez   11/15/2022  8:15 AM Tom Castillo  [place], [place] [address]

## 2022-11-14 RX ORDER — MOMETASONE FUROATE 1 MG/G
CREAM TOPICAL
Qty: 15 G | Refills: 0 | Status: SHIPPED | OUTPATIENT
Start: 2022-11-14

## 2022-11-15 ENCOUNTER — OFFICE VISIT (OUTPATIENT)
Dept: FAMILY MEDICINE CLINIC | Age: 47
End: 2022-11-15
Payer: COMMERCIAL

## 2022-11-15 VITALS
OXYGEN SATURATION: 99 % | SYSTOLIC BLOOD PRESSURE: 120 MMHG | WEIGHT: 221 LBS | HEART RATE: 88 BPM | BODY MASS INDEX: 39.16 KG/M2 | HEIGHT: 63 IN | TEMPERATURE: 96.9 F | DIASTOLIC BLOOD PRESSURE: 80 MMHG

## 2022-11-15 DIAGNOSIS — E66.09 CLASS 2 OBESITY DUE TO EXCESS CALORIES WITH BODY MASS INDEX (BMI) OF 39.0 TO 39.9 IN ADULT, UNSPECIFIED WHETHER SERIOUS COMORBIDITY PRESENT: ICD-10-CM

## 2022-11-15 DIAGNOSIS — T78.02XS: Primary | ICD-10-CM

## 2022-11-15 PROCEDURE — G8484 FLU IMMUNIZE NO ADMIN: HCPCS | Performed by: FAMILY MEDICINE

## 2022-11-15 PROCEDURE — 1036F TOBACCO NON-USER: CPT | Performed by: FAMILY MEDICINE

## 2022-11-15 PROCEDURE — 99214 OFFICE O/P EST MOD 30 MIN: CPT | Performed by: FAMILY MEDICINE

## 2022-11-15 PROCEDURE — G8427 DOCREV CUR MEDS BY ELIG CLIN: HCPCS | Performed by: FAMILY MEDICINE

## 2022-11-15 PROCEDURE — G8417 CALC BMI ABV UP PARAM F/U: HCPCS | Performed by: FAMILY MEDICINE

## 2022-11-15 RX ORDER — EPINEPHRINE 0.3 MG/.3ML
INJECTION SUBCUTANEOUS
Qty: 2 EACH | Refills: 2 | Status: SHIPPED | OUTPATIENT
Start: 2022-11-15

## 2022-11-15 RX ORDER — PHENTERMINE HYDROCHLORIDE 37.5 MG/1
37.5 TABLET ORAL
Qty: 30 TABLET | Refills: 0 | Status: SHIPPED | OUTPATIENT
Start: 2022-11-15 | End: 2022-12-15

## 2022-12-14 ENCOUNTER — OFFICE VISIT (OUTPATIENT)
Dept: FAMILY MEDICINE CLINIC | Age: 47
End: 2022-12-14
Payer: COMMERCIAL

## 2022-12-14 VITALS
TEMPERATURE: 97.5 F | RESPIRATION RATE: 16 BRPM | DIASTOLIC BLOOD PRESSURE: 82 MMHG | HEIGHT: 63 IN | HEART RATE: 95 BPM | BODY MASS INDEX: 36.43 KG/M2 | SYSTOLIC BLOOD PRESSURE: 128 MMHG | OXYGEN SATURATION: 96 % | WEIGHT: 205.6 LBS

## 2022-12-14 DIAGNOSIS — E66.09 CLASS 2 OBESITY DUE TO EXCESS CALORIES WITH BODY MASS INDEX (BMI) OF 39.0 TO 39.9 IN ADULT, UNSPECIFIED WHETHER SERIOUS COMORBIDITY PRESENT: Primary | ICD-10-CM

## 2022-12-14 PROCEDURE — G8427 DOCREV CUR MEDS BY ELIG CLIN: HCPCS | Performed by: FAMILY MEDICINE

## 2022-12-14 PROCEDURE — 1036F TOBACCO NON-USER: CPT | Performed by: FAMILY MEDICINE

## 2022-12-14 PROCEDURE — G8484 FLU IMMUNIZE NO ADMIN: HCPCS | Performed by: FAMILY MEDICINE

## 2022-12-14 PROCEDURE — 99213 OFFICE O/P EST LOW 20 MIN: CPT | Performed by: FAMILY MEDICINE

## 2022-12-14 PROCEDURE — G8417 CALC BMI ABV UP PARAM F/U: HCPCS | Performed by: FAMILY MEDICINE

## 2022-12-14 RX ORDER — PHENTERMINE HYDROCHLORIDE 37.5 MG/1
37.5 TABLET ORAL
Qty: 30 TABLET | Refills: 0 | Status: SHIPPED | OUTPATIENT
Start: 2022-12-14 | End: 2023-01-13

## 2022-12-14 NOTE — PROGRESS NOTES
Chief Complaint   Patient presents with    Follow-up     Patient presents today for a 4 weeks f/u for weight management. HPI: Marie Sauceda 50 y.o. female presenting for     Asthma   Reports that it has been bad this last month   Patient reports that it is better   No issues or concerns. Has not worked out as much due to the asthma flares. Obesity   Patient reports since April she has gained about 19 pounds. Devi was keto diet. Reports she lost several pounds but not significant. She was able at one point to get down to 197 pounds. Patient tried restriction and avoid late night eating. Patient has been eating more fruits, chicken. Patient reports that years ago she was on adipex. At that time she lost 90 pounds. Denies any side effects on the medication. Devi would like to have a level. F/u   Would like to go back to adipex. Active but not exercising. Has lost 17.8 pounds     Current Outpatient Medications   Medication Sig Dispense Refill    phentermine (ADIPEX-P) 37.5 MG tablet Take 1 tablet by mouth every morning (before breakfast) for 30 days. 30 tablet 0    EPINEPHrine (EPIPEN) 0.3 MG/0.3ML SOAJ injection Use as directed for allergic reaction 2 each 2    mometasone (ELOCON) 0.1 % cream APPLY TOPICALLY TO AFFECTED AREA(S) ONCE DAILY 15 g 0    valACYclovir (VALTREX) 500 MG tablet TAKE 1 TABLET BY MOUTH DAILY 90 tablet 1    albuterol sulfate  (90 Base) MCG/ACT inhaler Inhale 2 puffs into the lungs every 4 hours as needed for Wheezing or Shortness of Breath 1 each 3     No current facility-administered medications for this visit. ROS  CONSTITUTIONAL: The patient denies fevers, chills, sweats and body ache. Admits to weight loss    HEENT: Denies headache, blurry vision, eye pain, tinnitus, vertigo,  sore throat, neck or thyroid masses. RESPIRATORY: Denies cough, sputum, hemoptysis.   CARDIAC: Denies chest pain, pressure, palpitations, Denies lower extremity edema.  GASTROINTESTINAL: Denies abdominal pain, constipation, diarrhea, bleeding in the stools,   GENITOURINARY: admits to foul odor in urine, cloudy urine and dysuria, hematuria, nocturia or frequency, urinary incontinence. NEUROLOGIC: Denies headaches, dizziness, syncope, weakness  MUSCULOSKELETAL: Knee pain is resolved. ENDOCRINOLOGY: Denies heat or cold intolerance. HEMATOLOGY: Denies easy bleeding or blood transfusion,anemia  DERMATOLOGY: Denies changes in moles or pigmentation changes. PSYCHIATRY: Denies depression, agitation or anxiety. Past Medical History:   Diagnosis Date    Anemia     Asthma     Migraines     Staph infection     -hx staph colonization        Past Surgical History:   Procedure Laterality Date    ABDOMEN SURGERY      mass removed     SECTION          Family History   Problem Relation Age of Onset    Hypertension Mother     Asthma Mother     Hypertension Father     Heart Disease Father     Stomach Cancer Father     Emphysema Father     Breast Cancer Maternal Grandmother     Breast Cancer Maternal Aunt     Breast Cancer Paternal Cousin         Social History     Socioeconomic History    Marital status:      Spouse name: Not on file    Number of children: Not on file    Years of education: Not on file    Highest education level: Not on file   Occupational History    Not on file   Tobacco Use    Smoking status: Never    Smokeless tobacco: Never   Substance and Sexual Activity    Alcohol use:  Yes     Alcohol/week: 0.0 standard drinks    Drug use: No    Sexual activity: Not on file   Other Topics Concern    Not on file   Social History Narrative    Not on file     Social Determinants of Health     Financial Resource Strain: Low Risk     Difficulty of Paying Living Expenses: Not hard at all   Food Insecurity: No Food Insecurity    Worried About Running Out of Food in the Last Year: Never true    [address] in the Last Year: Never true   Transportation Needs: Not on file   Physical Activity: Not on file   Stress: Not on file   Social Connections: Not on file   Intimate Partner Violence: Not on file   Housing Stability: Not on file        /82 (Site: Left Upper Arm, Position: Sitting, Cuff Size: Large Adult)   Pulse 95   Temp 97.5 °F (36.4 °C) (Temporal)   Resp 16   Ht 5' 3\" (1.6 m)   Wt 205 lb 9.6 oz (93.3 kg)   LMP 10/16/2022 (Exact Date)   SpO2 96%   BMI 36.42 kg/m²        Physical Exam:    General appearance - alert, well appearing, and in no distress, obese  Mental Status - alert, oriented to person, place, and time  Eyes - pupils equal and reactive, extraocular eye movements intact   Ears - bilateral TM's and external ear canals normal   Nose - normal and patent, no erythema, discharge or polyps   Sinuses - Normal paranasal sinuses without tenderness   Throat - mucous membranes moist, pharynx normal without lesions   Neck - supple, no significant adenopathy   Thyroid - thyroid is normal in size without nodules or tenderness    Chest - clear to auscultation, no wheezes, rales or rhonchi, symmetric air entry   Heart - normal rate, regular rhythm, normal S1, S2, no murmurs, rubs, clicks or gallops  Abdomen - soft, nontender, nondistended, no masses or organomegaly   Back exam - full range of motion, no tenderness, palpable spasm or pain on motion   Neurological - alert, oriented, normal speech, no focal findings or movement disorder noted   Musculoskeletal -negative exam.  Extremities - peripheral pulses normal, no pedal edema, no clubbing or cyanosis   Skin -left there is improved substantially. No issues or concerns. No tenderness to palpation.       Labs   TSH   Date Value Ref Range Status   10/18/2022 2.570 0.440 - 3.860 uIU/mL Final   06/16/2021 2.380 0.440 - 3.860 uIU/mL Final   10/27/2017 2.720 0.270 - 4.200 uIU/mL Final     TSH   Date Value Ref Range Status   11/25/2014 2.620 0.270 - 4.200 uIU/mL Final   04/12/2013 1.926 0.550 - 4.780 uIU/mL Final     Lab Results   Component Value Date     10/27/2017    K 5.3 (H) 10/27/2017     10/27/2017    CO2 22 10/27/2017    BUN 21 (H) 10/27/2017    CREATININE 0.73 10/27/2017    GLUCOSE 81 10/27/2017    CALCIUM 9.2 10/27/2017    PROT 7.2 10/27/2017    LABALBU 4.0 10/27/2017    BILITOT 0.3 10/27/2017    ALKPHOS 48 10/27/2017    AST 19 10/27/2017    ALT 17 10/27/2017    LABGLOM >60.0 10/27/2017    GFRAA >60.0 10/27/2017    GLOB 3.2 10/27/2017     Lab Results   Component Value Date    WBC 7.0 10/18/2022    HGB 11.1 (L) 10/18/2022    HCT 35.2 (L) 10/18/2022    MCV 83.2 10/18/2022     10/18/2022     Lab Results   Component Value Date    LABA1C 5.6 10/18/2022     No results found for: EAG      A/P: Marlon Lee 50 y.o. female presenting for    1. Class 2 obesity due to excess calories with body mass index (BMI) of 39.0 to 39.9 in adult, unspecified whether serious comorbidity present    - phentermine (ADIPEX-P) 37.5 MG tablet; Take 1 tablet by mouth every morning (before breakfast) for 30 days. Dispense: 30 tablet; Refill: 0        Please note, this report has been partially produced using speech recognition software  and may cause  and /or contain errors related to that system including grammar, punctuation and spelling as well as words and phrases that may seem inappropriate. If there are questions or concerns please feel free to contact me to clarify.

## 2023-01-10 ENCOUNTER — OFFICE VISIT (OUTPATIENT)
Dept: FAMILY MEDICINE CLINIC | Age: 48
End: 2023-01-10
Payer: COMMERCIAL

## 2023-01-10 VITALS
HEIGHT: 63 IN | HEART RATE: 90 BPM | WEIGHT: 196 LBS | DIASTOLIC BLOOD PRESSURE: 86 MMHG | OXYGEN SATURATION: 97 % | SYSTOLIC BLOOD PRESSURE: 126 MMHG | BODY MASS INDEX: 34.73 KG/M2 | TEMPERATURE: 97.3 F

## 2023-01-10 DIAGNOSIS — E66.09 CLASS 1 OBESITY DUE TO EXCESS CALORIES WITH BODY MASS INDEX (BMI) OF 34.0 TO 34.9 IN ADULT, UNSPECIFIED WHETHER SERIOUS COMORBIDITY PRESENT: ICD-10-CM

## 2023-01-10 DIAGNOSIS — E55.9 VITAMIN D DEFICIENCY: ICD-10-CM

## 2023-01-10 DIAGNOSIS — R30.0 DYSURIA: ICD-10-CM

## 2023-01-10 DIAGNOSIS — R30.0 DYSURIA: Primary | ICD-10-CM

## 2023-01-10 DIAGNOSIS — B00.9 HSV (HERPES SIMPLEX VIRUS) INFECTION: ICD-10-CM

## 2023-01-10 DIAGNOSIS — L30.9 DERMATITIS: ICD-10-CM

## 2023-01-10 PROCEDURE — G8484 FLU IMMUNIZE NO ADMIN: HCPCS | Performed by: FAMILY MEDICINE

## 2023-01-10 PROCEDURE — G8417 CALC BMI ABV UP PARAM F/U: HCPCS | Performed by: FAMILY MEDICINE

## 2023-01-10 PROCEDURE — 99214 OFFICE O/P EST MOD 30 MIN: CPT | Performed by: FAMILY MEDICINE

## 2023-01-10 PROCEDURE — 1036F TOBACCO NON-USER: CPT | Performed by: FAMILY MEDICINE

## 2023-01-10 PROCEDURE — G8427 DOCREV CUR MEDS BY ELIG CLIN: HCPCS | Performed by: FAMILY MEDICINE

## 2023-01-10 RX ORDER — MOMETASONE FUROATE 1 MG/G
CREAM TOPICAL
Qty: 15 G | Refills: 0 | Status: SHIPPED | OUTPATIENT
Start: 2023-01-10

## 2023-01-10 RX ORDER — VALACYCLOVIR HYDROCHLORIDE 500 MG/1
TABLET, FILM COATED ORAL
Qty: 90 TABLET | Refills: 1 | Status: SHIPPED | OUTPATIENT
Start: 2023-01-10

## 2023-01-10 RX ORDER — NITROFURANTOIN 25; 75 MG/1; MG/1
100 CAPSULE ORAL 2 TIMES DAILY
Qty: 10 CAPSULE | Refills: 0 | Status: SHIPPED | OUTPATIENT
Start: 2023-01-10 | End: 2023-01-15

## 2023-01-10 RX ORDER — FLUCONAZOLE 150 MG/1
150 TABLET ORAL
Qty: 2 TABLET | Refills: 0 | Status: SHIPPED | OUTPATIENT
Start: 2023-01-10 | End: 2023-01-16

## 2023-01-10 RX ORDER — PHENTERMINE HYDROCHLORIDE 37.5 MG/1
37.5 TABLET ORAL
Qty: 30 TABLET | Refills: 0 | Status: SHIPPED | OUTPATIENT
Start: 2023-01-10 | End: 2023-02-09

## 2023-01-10 ASSESSMENT — PATIENT HEALTH QUESTIONNAIRE - PHQ9
SUM OF ALL RESPONSES TO PHQ QUESTIONS 1-9: 0
SUM OF ALL RESPONSES TO PHQ9 QUESTIONS 1 & 2: 0
SUM OF ALL RESPONSES TO PHQ QUESTIONS 1-9: 0
1. LITTLE INTEREST OR PLEASURE IN DOING THINGS: 0
2. FEELING DOWN, DEPRESSED OR HOPELESS: 0

## 2023-01-11 LAB — VITAMIN D 25-HYDROXY: 32.1 NG/ML

## 2023-01-12 LAB — URINE CULTURE, ROUTINE: NORMAL

## 2023-04-17 ENCOUNTER — OFFICE VISIT (OUTPATIENT)
Dept: FAMILY MEDICINE CLINIC | Age: 48
End: 2023-04-17
Payer: COMMERCIAL

## 2023-04-17 VITALS
SYSTOLIC BLOOD PRESSURE: 124 MMHG | WEIGHT: 188 LBS | OXYGEN SATURATION: 98 % | HEART RATE: 68 BPM | TEMPERATURE: 96.9 F | BODY MASS INDEX: 33.31 KG/M2 | HEIGHT: 63 IN | DIASTOLIC BLOOD PRESSURE: 72 MMHG

## 2023-04-17 DIAGNOSIS — N89.8 VAGINAL DISCHARGE: ICD-10-CM

## 2023-04-17 DIAGNOSIS — G43.919 INTRACTABLE MIGRAINE WITHOUT STATUS MIGRAINOSUS, UNSPECIFIED MIGRAINE TYPE: Primary | ICD-10-CM

## 2023-04-17 DIAGNOSIS — Z12.4 CERVICAL CANCER SCREENING: ICD-10-CM

## 2023-04-17 PROCEDURE — G8427 DOCREV CUR MEDS BY ELIG CLIN: HCPCS | Performed by: FAMILY MEDICINE

## 2023-04-17 PROCEDURE — 3017F COLORECTAL CA SCREEN DOC REV: CPT | Performed by: FAMILY MEDICINE

## 2023-04-17 PROCEDURE — G8417 CALC BMI ABV UP PARAM F/U: HCPCS | Performed by: FAMILY MEDICINE

## 2023-04-17 PROCEDURE — 1036F TOBACCO NON-USER: CPT | Performed by: FAMILY MEDICINE

## 2023-04-17 PROCEDURE — 99214 OFFICE O/P EST MOD 30 MIN: CPT | Performed by: FAMILY MEDICINE

## 2023-04-17 RX ORDER — SUMATRIPTAN 50 MG/1
50 TABLET, FILM COATED ORAL DAILY PRN
Qty: 9 TABLET | Refills: 5 | Status: SHIPPED | OUTPATIENT
Start: 2023-04-17

## 2023-04-17 SDOH — ECONOMIC STABILITY: INCOME INSECURITY: HOW HARD IS IT FOR YOU TO PAY FOR THE VERY BASICS LIKE FOOD, HOUSING, MEDICAL CARE, AND HEATING?: NOT HARD AT ALL

## 2023-04-17 SDOH — ECONOMIC STABILITY: FOOD INSECURITY: WITHIN THE PAST 12 MONTHS, THE FOOD YOU BOUGHT JUST DIDN'T LAST AND YOU DIDN'T HAVE MONEY TO GET MORE.: NEVER TRUE

## 2023-04-17 SDOH — ECONOMIC STABILITY: HOUSING INSECURITY
IN THE LAST 12 MONTHS, WAS THERE A TIME WHEN YOU DID NOT HAVE A STEADY PLACE TO SLEEP OR SLEPT IN A SHELTER (INCLUDING NOW)?: NO

## 2023-04-17 SDOH — ECONOMIC STABILITY: FOOD INSECURITY: WITHIN THE PAST 12 MONTHS, YOU WORRIED THAT YOUR FOOD WOULD RUN OUT BEFORE YOU GOT MONEY TO BUY MORE.: NEVER TRUE

## 2023-04-17 NOTE — PROGRESS NOTES
Chief Complaint   Patient presents with    Migraine     Patient is here for migraines due to weather changing frequently. Was taking imitrex but ran out. Wants to discuss this medication. Not sure if to continue, it makes her tired. HPI: Kendall Cheadle 46 y.o. female presenting for     Migraines   Takes imitrex wich hdoes help but makes her tired   Patetint would like to try another medication to see if it helps   Gets the migraines with weather changes worse in the winter and fall. Vaginal discharge   Admits to a fishy odor after sexual intercourse   Would like to be screened for STD      Obesity   Patient reports since April she has gained about 19 pounds. Devi was keto diet. Reports she lost several pounds but not significant. She was able at one point to get down to 197 pounds. Patient tried restriction and avoid late night eating. Patient has been eating more fruits, chicken. Patient reports that years ago she was on adipex. At that time she lost 90 pounds. Denies any side effects on the medication. Devi would like to have a level. F/u   Would like to go back to adipex. Active but not exercising. Has lost 17.8 pounds     F/u   Losing weight on her own   Doing well   No issues or concerns       Current Outpatient Medications   Medication Sig Dispense Refill    valACYclovir (VALTREX) 500 MG tablet TAKE 1 TABLET BY MOUTH DAILY 90 tablet 2    mometasone (ELOCON) 0.1 % cream APPLY TOPICALLY TO AFFECTED AREA(S) ONCE DAILY 15 g 0    EPINEPHrine (EPIPEN) 0.3 MG/0.3ML SOAJ injection Use as directed for allergic reaction 2 each 2    albuterol sulfate  (90 Base) MCG/ACT inhaler Inhale 2 puffs into the lungs every 4 hours as needed for Wheezing or Shortness of Breath 1 each 3     No current facility-administered medications for this visit. ROS  CONSTITUTIONAL: The patient denies fevers, chills, sweats and body ache.  Admits to weight loss    HEENT: Denies headache,

## 2023-04-19 DIAGNOSIS — B37.9 YEAST INFECTION: Primary | ICD-10-CM

## 2023-04-19 RX ORDER — FLUCONAZOLE 150 MG/1
150 TABLET ORAL
Qty: 2 TABLET | Refills: 0 | Status: SHIPPED | OUTPATIENT
Start: 2023-04-19 | End: 2023-04-25

## 2023-04-21 DIAGNOSIS — N76.0 BACTERIAL VAGINOSIS: Primary | ICD-10-CM

## 2023-04-21 DIAGNOSIS — B96.89 BACTERIAL VAGINOSIS: Primary | ICD-10-CM

## 2023-04-21 RX ORDER — METRONIDAZOLE 500 MG/1
500 TABLET ORAL 2 TIMES DAILY
Qty: 14 TABLET | Refills: 0 | Status: SHIPPED | OUTPATIENT
Start: 2023-04-21 | End: 2023-04-28

## 2023-11-03 ENCOUNTER — OFFICE VISIT (OUTPATIENT)
Dept: FAMILY MEDICINE CLINIC | Age: 48
End: 2023-11-03
Payer: COMMERCIAL

## 2023-11-03 VITALS
HEIGHT: 63 IN | DIASTOLIC BLOOD PRESSURE: 74 MMHG | SYSTOLIC BLOOD PRESSURE: 122 MMHG | WEIGHT: 207 LBS | OXYGEN SATURATION: 97 % | BODY MASS INDEX: 36.68 KG/M2 | HEART RATE: 111 BPM | TEMPERATURE: 98.1 F

## 2023-11-03 DIAGNOSIS — R35.0 FREQUENT URINATION: ICD-10-CM

## 2023-11-03 DIAGNOSIS — N30.00 ACUTE CYSTITIS WITHOUT HEMATURIA: Primary | ICD-10-CM

## 2023-11-03 LAB
BILIRUBIN, POC: ABNORMAL
BLOOD URINE, POC: ABNORMAL
CLARITY, POC: ABNORMAL
COLOR, POC: YELLOW
GLUCOSE URINE, POC: ABNORMAL
KETONES, POC: ABNORMAL
LEUKOCYTE EST, POC: ABNORMAL
NITRITE, POC: ABNORMAL
PH, POC: 7
PROTEIN, POC: ABNORMAL
SPECIFIC GRAVITY, POC: 1.02
UROBILINOGEN, POC: ABNORMAL

## 2023-11-03 PROCEDURE — 3017F COLORECTAL CA SCREEN DOC REV: CPT | Performed by: NURSE PRACTITIONER

## 2023-11-03 PROCEDURE — 99213 OFFICE O/P EST LOW 20 MIN: CPT | Performed by: NURSE PRACTITIONER

## 2023-11-03 PROCEDURE — 1036F TOBACCO NON-USER: CPT | Performed by: NURSE PRACTITIONER

## 2023-11-03 PROCEDURE — G8484 FLU IMMUNIZE NO ADMIN: HCPCS | Performed by: NURSE PRACTITIONER

## 2023-11-03 PROCEDURE — G8417 CALC BMI ABV UP PARAM F/U: HCPCS | Performed by: NURSE PRACTITIONER

## 2023-11-03 PROCEDURE — 81003 URINALYSIS AUTO W/O SCOPE: CPT | Performed by: NURSE PRACTITIONER

## 2023-11-03 PROCEDURE — G8427 DOCREV CUR MEDS BY ELIG CLIN: HCPCS | Performed by: NURSE PRACTITIONER

## 2023-11-03 RX ORDER — FLUCONAZOLE 150 MG/1
150 TABLET ORAL
Qty: 2 TABLET | Refills: 0 | Status: SHIPPED | OUTPATIENT
Start: 2023-11-03 | End: 2023-11-09

## 2023-11-03 RX ORDER — NITROFURANTOIN 25; 75 MG/1; MG/1
100 CAPSULE ORAL 2 TIMES DAILY
Qty: 14 CAPSULE | Refills: 0 | Status: SHIPPED | OUTPATIENT
Start: 2023-11-03 | End: 2023-11-10

## 2023-11-03 NOTE — PROGRESS NOTES
Subjective:      Patient ID: Mary Barillas is a 46 y.o. female who presents today for:  Chief Complaint   Patient presents with    Urinary Tract Infection     Pt states pressure and frequent urination x 4 days        HPI  Patient is here with c/o urinary frequency and pressure for the last 4 days. She has mild burning. Denies any flank area. Says she has no blood in urine and no fever or chills. Says she has had UTI in the past.   Says she has no vaginal sx. Past Medical History:   Diagnosis Date    Anemia     Asthma     Migraines     Staph infection     2011-hx staph colonization     Past Surgical History:   Procedure Laterality Date    ABDOMEN SURGERY  1997    mass removed     SECTION       Social History     Socioeconomic History    Marital status:      Spouse name: Not on file    Number of children: Not on file    Years of education: Not on file    Highest education level: Not on file   Occupational History    Not on file   Tobacco Use    Smoking status: Never    Smokeless tobacco: Never   Substance and Sexual Activity    Alcohol use: Yes     Comment: Occasionally. Not daily. Drug use: No    Sexual activity: Yes     Partners: Male   Other Topics Concern    Not on file   Social History Narrative    Not on file     Social Determinants of Health     Financial Resource Strain: Low Risk  (2023)    Overall Financial Resource Strain (CARDIA)     Difficulty of Paying Living Expenses: Not hard at all   Food Insecurity: No Food Insecurity (2023)    Hunger Vital Sign     Worried About Running Out of Food in the Last Year: Never true     [address] in the Last Year: Never true   Transportation Needs: Unknown (2023)    PRAPARE - Transportation     Lack of Transportation (Medical): Not on file     Lack of Transportation (Non-Medical):  No   Physical Activity: Not on file   Stress: Not on file   Social Connections: Not on file   Intimate Partner Violence: Not on file

## 2023-11-05 LAB
BACTERIA UR CULT: ABNORMAL
BACTERIA UR CULT: ABNORMAL
ORGANISM: ABNORMAL

## 2023-11-06 NOTE — RESULT ENCOUNTER NOTE
Please advise patient that urine CX is showing bacteria, cont with Macrobid and follow up with PCP if sx persist.

## 2023-12-05 ENCOUNTER — OFFICE VISIT (OUTPATIENT)
Dept: FAMILY MEDICINE CLINIC | Age: 48
End: 2023-12-05
Payer: COMMERCIAL

## 2023-12-05 VITALS
BODY MASS INDEX: 37.92 KG/M2 | OXYGEN SATURATION: 96 % | HEART RATE: 90 BPM | HEIGHT: 63 IN | SYSTOLIC BLOOD PRESSURE: 120 MMHG | WEIGHT: 214 LBS | DIASTOLIC BLOOD PRESSURE: 82 MMHG | TEMPERATURE: 97.4 F

## 2023-12-05 PROCEDURE — 3017F COLORECTAL CA SCREEN DOC REV: CPT | Performed by: FAMILY MEDICINE

## 2023-12-05 PROCEDURE — 1036F TOBACCO NON-USER: CPT | Performed by: FAMILY MEDICINE

## 2023-12-05 PROCEDURE — G8427 DOCREV CUR MEDS BY ELIG CLIN: HCPCS | Performed by: FAMILY MEDICINE

## 2023-12-05 PROCEDURE — G8417 CALC BMI ABV UP PARAM F/U: HCPCS | Performed by: FAMILY MEDICINE

## 2023-12-05 PROCEDURE — G8484 FLU IMMUNIZE NO ADMIN: HCPCS | Performed by: FAMILY MEDICINE

## 2023-12-05 PROCEDURE — 99213 OFFICE O/P EST LOW 20 MIN: CPT | Performed by: FAMILY MEDICINE

## 2023-12-05 RX ORDER — PHENTERMINE HYDROCHLORIDE 37.5 MG/1
37.5 TABLET ORAL
Qty: 30 TABLET | Refills: 0 | Status: SHIPPED | OUTPATIENT
Start: 2023-12-05 | End: 2024-01-04

## 2023-12-05 NOTE — PROGRESS NOTES
Chief Complaint   Patient presents with    Weight Management     Patient reports she would like to get back on adipex. Health Maintenance     Yes to mammogram            HPI: Mark Rogers 46 y.o. female presenting for         Obesity     Patient is here for adipex   Patient reports that she moved due to issues with her tenant   Patient reports taht she is back at e office and reports that none her clothes fit   Patient reports she will get  back to the gym and will make time to work out       Current Outpatient Medications   Medication Sig Dispense Refill    Rimegepant Sulfate 75 MG TBDP Take 1 tablet by mouth daily as needed (migraines) 30 tablet 0    SUMAtriptan (IMITREX) 50 MG tablet Take 1 tablet by mouth daily as needed for Migraine 9 tablet 5    valACYclovir (VALTREX) 500 MG tablet TAKE 1 TABLET BY MOUTH DAILY 90 tablet 2    mometasone (ELOCON) 0.1 % cream APPLY TOPICALLY TO AFFECTED AREA(S) ONCE DAILY 15 g 0    EPINEPHrine (EPIPEN) 0.3 MG/0.3ML SOAJ injection Use as directed for allergic reaction 2 each 2    albuterol sulfate  (90 Base) MCG/ACT inhaler Inhale 2 puffs into the lungs every 4 hours as needed for Wheezing or Shortness of Breath 1 each 3     No current facility-administered medications for this visit. ROS  CONSTITUTIONAL: The patient denies fevers, chills, sweats and body ache. Admits to weight gain  HEENT: Denies headache, blurry vision, eye pain, tinnitus, vertigo,  sore throat, neck or thyroid masses. RESPIRATORY: Denies cough, sputum, hemoptysis. CARDIAC: Denies chest pain, pressure, palpitations, Denies lower extremity edema. GASTROINTESTINAL: Denies abdominal pain, constipation, diarrhea, bleeding in the stools,   GENITOURINARY: admits to foul odor in urine, cloudy urine and dysuria, hematuria, nocturia or frequency, urinary incontinence. NEUROLOGIC: Denies headaches, dizziness, syncope, weakness  MUSCULOSKELETAL: Knee pain is resolved.   ENDOCRINOLOGY: Denies

## 2024-01-04 ASSESSMENT — PATIENT HEALTH QUESTIONNAIRE - PHQ9
1. LITTLE INTEREST OR PLEASURE IN DOING THINGS: NOT AT ALL
SUM OF ALL RESPONSES TO PHQ QUESTIONS 1-9: 0
SUM OF ALL RESPONSES TO PHQ9 QUESTIONS 1 & 2: 0
2. FEELING DOWN, DEPRESSED OR HOPELESS: NOT AT ALL
2. FEELING DOWN, DEPRESSED OR HOPELESS: 0
SUM OF ALL RESPONSES TO PHQ QUESTIONS 1-9: 0
SUM OF ALL RESPONSES TO PHQ QUESTIONS 1-9: 0
1. LITTLE INTEREST OR PLEASURE IN DOING THINGS: 0
SUM OF ALL RESPONSES TO PHQ9 QUESTIONS 1 & 2: 0
SUM OF ALL RESPONSES TO PHQ QUESTIONS 1-9: 0

## 2024-01-05 ENCOUNTER — OFFICE VISIT (OUTPATIENT)
Dept: FAMILY MEDICINE CLINIC | Age: 49
End: 2024-01-05

## 2024-01-05 VITALS
DIASTOLIC BLOOD PRESSURE: 80 MMHG | OXYGEN SATURATION: 100 % | BODY MASS INDEX: 34.22 KG/M2 | WEIGHT: 205.4 LBS | TEMPERATURE: 97.4 F | HEIGHT: 65 IN | HEART RATE: 86 BPM | SYSTOLIC BLOOD PRESSURE: 128 MMHG

## 2024-01-05 DIAGNOSIS — Z12.31 ENCOUNTER FOR SCREENING MAMMOGRAM FOR MALIGNANT NEOPLASM OF BREAST: ICD-10-CM

## 2024-01-05 DIAGNOSIS — Z12.11 COLON CANCER SCREENING: ICD-10-CM

## 2024-01-05 RX ORDER — PHENTERMINE HYDROCHLORIDE 37.5 MG/1
37.5 TABLET ORAL
Qty: 30 TABLET | Refills: 0 | Status: SHIPPED | OUTPATIENT
Start: 2024-01-05 | End: 2024-02-04

## 2024-01-05 NOTE — PROGRESS NOTES
Chief Complaint   Patient presents with    1 Month Follow-Up    Weight Management            HPI: Evon Hogue 48 y.o. female presenting for         Obesity     Patient is here for adipex   Patient reports that she moved due to issues with her tenant   Patient reports taht she is back at [facility] office and reports that none her clothes fit   Patient reports she will get  back to the gym and will make time to work out       Current Outpatient Medications   Medication Sig Dispense Refill    phentermine (ADIPEX-P) 37.5 MG tablet Take 1 tablet by mouth every morning (before breakfast) for 30 days. Max Daily Amount: 37.5 mg 30 tablet 0    Rimegepant Sulfate 75 MG TBDP Take 1 tablet by mouth daily as needed (migraines) 30 tablet 0    SUMAtriptan (IMITREX) 50 MG tablet Take 1 tablet by mouth daily as needed for Migraine 9 tablet 5    valACYclovir (VALTREX) 500 MG tablet TAKE 1 TABLET BY MOUTH DAILY 90 tablet 2    mometasone (ELOCON) 0.1 % cream APPLY TOPICALLY TO AFFECTED AREA(S) ONCE DAILY 15 g 0    EPINEPHrine (EPIPEN) 0.3 MG/0.3ML SOAJ injection Use as directed for allergic reaction 2 each 2    albuterol sulfate  (90 Base) MCG/ACT inhaler Inhale 2 puffs into the lungs every 4 hours as needed for Wheezing or Shortness of Breath 1 each 3     No current facility-administered medications for this visit.        ROS  CONSTITUTIONAL: The patient denies fevers, chills, sweats and body ache. Admits to weight loss   HEENT: Denies headache, blurry vision, eye pain, tinnitus, vertigo,  sore throat, neck or thyroid masses.  RESPIRATORY: Denies cough, sputum, hemoptysis.  CARDIAC: Denies chest pain, pressure, palpitations, Denies lower extremity edema.  GASTROINTESTINAL: Denies abdominal pain, constipation, diarrhea, bleeding in the stools,   GENITOURINARY: admits to foul odor in urine, cloudy urine and dysuria, hematuria, nocturia or frequency, urinary incontinence.  NEUROLOGIC: Denies headaches, dizziness, syncope,

## 2024-01-08 RX ORDER — POLYETHYLENE GLYCOL 3350, SODIUM CHLORIDE, SODIUM BICARBONATE, POTASSIUM CHLORIDE 420; 11.2; 5.72; 1.48 G/4L; G/4L; G/4L; G/4L
4000 POWDER, FOR SOLUTION ORAL ONCE
Qty: 4000 ML | Refills: 0 | Status: SHIPPED | OUTPATIENT
Start: 2024-01-08 | End: 2024-01-08

## 2024-01-29 ENCOUNTER — PREP FOR PROCEDURE (OUTPATIENT)
Dept: GASTROENTEROLOGY | Age: 49
End: 2024-01-29

## 2024-01-29 ENCOUNTER — TELEPHONE (OUTPATIENT)
Dept: FAMILY MEDICINE CLINIC | Age: 49
End: 2024-01-29

## 2024-01-29 RX ORDER — SODIUM CHLORIDE 9 MG/ML
INJECTION, SOLUTION INTRAVENOUS CONTINUOUS
Status: CANCELLED | OUTPATIENT
Start: 2024-01-29

## 2024-01-29 RX ORDER — SODIUM CHLORIDE 0.9 % (FLUSH) 0.9 %
5-40 SYRINGE (ML) INJECTION PRN
Status: CANCELLED | OUTPATIENT
Start: 2024-01-29

## 2024-01-29 RX ORDER — SODIUM CHLORIDE 9 MG/ML
INJECTION, SOLUTION INTRAVENOUS PRN
Status: CANCELLED | OUTPATIENT
Start: 2024-01-29

## 2024-01-29 RX ORDER — SODIUM CHLORIDE 0.9 % (FLUSH) 0.9 %
5-40 SYRINGE (ML) INJECTION EVERY 12 HOURS SCHEDULED
Status: CANCELLED | OUTPATIENT
Start: 2024-01-29

## 2024-01-29 NOTE — TELEPHONE ENCOUNTER
I hope she feels better.     So she knows this is the currently guidelines for covid   Please let patient know day 0 is when the symptoms started.  Patient should quarantine for 5 days.  On day 6 when symptoms have improved and is no longer experiencing any fevers (without using any medications to reduce the fever), the patient can get off quarantine measures.  I would recommend patient wear a mask for an additional 5 days indoors and outdoors.  There is COVID medication that can help with reducing symptoms and possibly shortening the course of infection.  If patient would like the medication, please let me know and I can send it to their pharmacy.

## 2024-01-29 NOTE — TELEPHONE ENCOUNTER
Patient had to reschedule her appointment for tomorrow.  She stated that she will have to 1/2 her pills for the last 5-6 days.  She stated she had to do that when she first started medication.      She stated she hasn't been taking the medication because of the way she feels.  She tested positive for Covid and she hasn't been able to eat or sleep correctly.

## 2024-01-29 NOTE — PROGRESS NOTES
Chief Complaint   Patient presents with    1 Month Follow-Up     Pt is here for f/u    Flu Vaccine     Pt declined flu vacc. HPI: Manpreet Blunt 50 y.o. female presenting for         Dermatitis   Take the mometasone to help wit hthe dermatitis on her hands bilaterally   Worse in the winter         Menorrhagia   Takes OCP to help with regulate her periods   Denies any issues with the medication. Cycles are regular. F/u   Recently took herself off of the medication   Would like to conceive naturally. Devi had regular menstrual cycles until she got off of the medication   We will like to hold off with a gynecology referral.    F/u   Tried metformin. Did not find it helpful   Would like to hold off on gyn referral and conceiving. Would like to see if it happens naturally. Obesity   Patient reports since April she has gained about 19 pounds. Devi was keto diet. Reports she lost several pounds but not significant. She was able at one point to get down to 197 pounds. Patient tried restriction and avoid late night eating. Patient has been eating more fruits, chicken. Patient reports that years ago she was on adipex. At that time she lost 90 pounds. Denies any side effects on the medication. Devi would like to have a level. F/u   Would like to go back to adipex. Active but not exercising.        Allergies   Has allergies to mushrooms (hives)  Seafood (anaphylaxis)   Benadryl makes her sleepy   Would like other medications     Current Outpatient Medications   Medication Sig Dispense Refill    mometasone (ELOCON) 0.1 % cream APPLY TOPICALLY TO AFFECTED AREA(S) ONCE DAILY 15 g 0    metFORMIN (GLUCOPHAGE) 500 MG tablet Take 1 tablet by mouth daily (with breakfast) 90 tablet 0    valACYclovir (VALTREX) 500 MG tablet TAKE 1 TABLET BY MOUTH DAILY 90 tablet 1    albuterol sulfate  (90 Base) MCG/ACT inhaler Inhale 2 puffs into the lungs every 4 hours as needed for Wheezing or Shortness of Breath 1 each 3     No current facility-administered medications for this visit. ROS  CONSTITUTIONAL: The patient denies fevers, chills, sweats and body ache. Admits to weight loss    HEENT: Denies headache, blurry vision, eye pain, tinnitus, vertigo,  sore throat, neck or thyroid masses. RESPIRATORY: Denies cough, sputum, hemoptysis. CARDIAC: Denies chest pain, pressure, palpitations, Denies lower extremity edema. GASTROINTESTINAL: Denies abdominal pain, constipation, diarrhea, bleeding in the stools,   GENITOURINARY: admits to foul odor in urine, cloudy urine and dysuria, hematuria, nocturia or frequency, urinary incontinence. NEUROLOGIC: Denies headaches, dizziness, syncope, weakness  MUSCULOSKELETAL: Knee pain is resolved. ENDOCRINOLOGY: Denies heat or cold intolerance. HEMATOLOGY: Denies easy bleeding or blood transfusion,anemia  DERMATOLOGY: Denies changes in moles or pigmentation changes. PSYCHIATRY: Denies depression, agitation or anxiety. Past Medical History:   Diagnosis Date    Anemia     Asthma     Migraines     Staph infection     -hx staph colonization        Past Surgical History:   Procedure Laterality Date    ABDOMEN SURGERY      mass removed     SECTION          Family History   Problem Relation Age of Onset    Hypertension Mother     Asthma Mother     Hypertension Father     Heart Disease Father     Stomach Cancer Father     Emphysema Father     Breast Cancer Maternal Grandmother     Breast Cancer Maternal Aunt     Breast Cancer Paternal Cousin         Social History     Socioeconomic History    Marital status:      Spouse name: Not on file    Number of children: Not on file    Years of education: Not on file    Highest education level: Not on file   Occupational History    Not on file   Tobacco Use    Smoking status: Never    Smokeless tobacco: Never   Substance and Sexual Activity    Alcohol use:  Yes     Alcohol/week: 0.0 standard drinks Drug use: No    Sexual activity: Not on file   Other Topics Concern    Not on file   Social History Narrative    Not on file     Social Determinants of Health     Financial Resource Strain: Low Risk     Difficulty of Paying Living Expenses: Not hard at all   Food Insecurity: No Food Insecurity    Worried About Running Out of Food in the Last Year: Never true    Ran Out of Food in the Last Year: Never true   Transportation Needs: Not on file   Physical Activity: Not on file   Stress: Not on file   Social Connections: Not on file   Intimate Partner Violence: Not on file   Housing Stability: Not on file        /80   Pulse 99   Temp 96.9 °F (36.1 °C) (Temporal)   Ht 5' 3\" (1.6 m)   Wt 221 lb (100.2 kg)   SpO2 99%   BMI 39.15 kg/m²        Physical Exam:    General appearance - alert, well appearing, and in no distress, obese  Mental Status - alert, oriented to person, place, and time  Eyes - pupils equal and reactive, extraocular eye movements intact   Ears - bilateral TM's and external ear canals normal   Nose - normal and patent, no erythema, discharge or polyps   Sinuses - Normal paranasal sinuses without tenderness   Throat - mucous membranes moist, pharynx normal without lesions   Neck - supple, no significant adenopathy   Thyroid - thyroid is normal in size without nodules or tenderness    Chest - clear to auscultation, no wheezes, rales or rhonchi, symmetric air entry   Heart - normal rate, regular rhythm, normal S1, S2, no murmurs, rubs, clicks or gallops  Abdomen - soft, nontender, nondistended, no masses or organomegaly   Back exam - full range of motion, no tenderness, palpable spasm or pain on motion   Neurological - alert, oriented, normal speech, no focal findings or movement disorder noted   Musculoskeletal -negative exam.  Extremities - peripheral pulses normal, no pedal edema, no clubbing or cyanosis   Skin -left there is improved substantially. No issues or concerns.   No tenderness to palpation. Labs   TSH   Date Value Ref Range Status   10/18/2022 2.570 0.440 - 3.860 uIU/mL Final   06/16/2021 2.380 0.440 - 3.860 uIU/mL Final   10/27/2017 2.720 0.270 - 4.200 uIU/mL Final     TSH   Date Value Ref Range Status   11/25/2014 2.620 0.270 - 4.200 uIU/mL Final   04/12/2013 1.926 0.550 - 4.780 uIU/mL Final     Lab Results   Component Value Date     10/27/2017    K 5.3 (H) 10/27/2017     10/27/2017    CO2 22 10/27/2017    BUN 21 (H) 10/27/2017    CREATININE 0.73 10/27/2017    GLUCOSE 81 10/27/2017    CALCIUM 9.2 10/27/2017    PROT 7.2 10/27/2017    LABALBU 4.0 10/27/2017    BILITOT 0.3 10/27/2017    ALKPHOS 48 10/27/2017    AST 19 10/27/2017    ALT 17 10/27/2017    LABGLOM >60.0 10/27/2017    GFRAA >60.0 10/27/2017    GLOB 3.2 10/27/2017     Lab Results   Component Value Date    WBC 7.0 10/18/2022    HGB 11.1 (L) 10/18/2022    HCT 35.2 (L) 10/18/2022    MCV 83.2 10/18/2022     10/18/2022     Lab Results   Component Value Date    LABA1C 5.6 10/18/2022     No results found for: EAG      A/P: Bettyjane Arrow 50 y.o. female presenting for    1. 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 Class 2 obesity due to excess calories with body mass index (BMI) of 39.0 to 39.9 in adult, unspecified whether serious comorbidity present    - phentermine (ADIPEX-P) 37.5 MG tablet; Take 1 tablet by mouth every morning (before breakfast) for 30 days. Dispense: 30 tablet; Refill: 0        Please note, this report has been partially produced using speech recognition software  and may cause  and /or contain errors related to that system including grammar, punctuation and spelling as well as words and phrases that may seem inappropriate. If there are questions or concerns please feel free to contact me to clarify. Negative

## 2024-02-07 ENCOUNTER — TELEPHONE (OUTPATIENT)
Dept: FAMILY MEDICINE CLINIC | Age: 49
End: 2024-02-07

## 2024-02-07 NOTE — TELEPHONE ENCOUNTER
Pt is complaining still feeling after effects from having Covid. Pt is feeling heaviness on her chest when she breathes. Pt would like to know what she should do.

## 2024-02-08 ENCOUNTER — OFFICE VISIT (OUTPATIENT)
Dept: FAMILY MEDICINE CLINIC | Age: 49
End: 2024-02-08
Payer: COMMERCIAL

## 2024-02-08 VITALS
BODY MASS INDEX: 33.82 KG/M2 | DIASTOLIC BLOOD PRESSURE: 86 MMHG | SYSTOLIC BLOOD PRESSURE: 126 MMHG | TEMPERATURE: 97.3 F | WEIGHT: 203 LBS | OXYGEN SATURATION: 97 % | HEIGHT: 65 IN | HEART RATE: 96 BPM

## 2024-02-08 DIAGNOSIS — R51.9 ACHING HEADACHE: ICD-10-CM

## 2024-02-08 DIAGNOSIS — R42 DIZZINESS: ICD-10-CM

## 2024-02-08 DIAGNOSIS — R00.2 HEART PALPITATIONS: ICD-10-CM

## 2024-02-08 DIAGNOSIS — U09.9 POST-COVID SYNDROME: Primary | ICD-10-CM

## 2024-02-08 PROCEDURE — 1036F TOBACCO NON-USER: CPT | Performed by: FAMILY MEDICINE

## 2024-02-08 PROCEDURE — G8484 FLU IMMUNIZE NO ADMIN: HCPCS | Performed by: FAMILY MEDICINE

## 2024-02-08 PROCEDURE — 99214 OFFICE O/P EST MOD 30 MIN: CPT | Performed by: FAMILY MEDICINE

## 2024-02-08 PROCEDURE — G8427 DOCREV CUR MEDS BY ELIG CLIN: HCPCS | Performed by: FAMILY MEDICINE

## 2024-02-08 PROCEDURE — 3017F COLORECTAL CA SCREEN DOC REV: CPT | Performed by: FAMILY MEDICINE

## 2024-02-08 PROCEDURE — G8417 CALC BMI ABV UP PARAM F/U: HCPCS | Performed by: FAMILY MEDICINE

## 2024-02-08 RX ORDER — KETOROLAC TROMETHAMINE 30 MG/ML
60 INJECTION, SOLUTION INTRAMUSCULAR; INTRAVENOUS ONCE
Status: COMPLETED | OUTPATIENT
Start: 2024-02-08 | End: 2024-02-08

## 2024-02-08 RX ORDER — KETOROLAC TROMETHAMINE 10 MG/1
10 TABLET, FILM COATED ORAL EVERY 6 HOURS PRN
Qty: 20 TABLET | Refills: 0 | Status: SHIPPED | OUTPATIENT
Start: 2024-02-08 | End: 2025-02-07

## 2024-02-08 RX ORDER — PHENTERMINE HYDROCHLORIDE 37.5 MG/1
37.5 TABLET ORAL
Qty: 30 TABLET | Refills: 0 | Status: SHIPPED | OUTPATIENT
Start: 2024-02-08 | End: 2024-03-09

## 2024-02-08 RX ADMIN — KETOROLAC TROMETHAMINE 60 MG: 30 INJECTION, SOLUTION INTRAMUSCULAR; INTRAVENOUS at 11:59

## 2024-02-08 NOTE — PROGRESS NOTES
Chief Complaint   Patient presents with    1 Month Follow-Up    Weight Management     Lost 2 pounds, recovering from covid    Post-COVID Symptoms     Patient still recivering from covid, bad headaches daily, dizzy even sitting    Health Maintenance     Refused flu vacc. Colonoscopy scheduled.             HPI: Evon Hogue 48 y.o. female presenting for     Post covid symptoms   Patient reports that she has a lingering headache and dizziness   Patient is taking some time to recover   Was having wheezing but it did improve.      Obesity   Patient is here for adipex   Patient reports that she moved due to issues with her tenant   Patient reports taht she is back at e office and reports that none her clothes fit   Patient reports she will get  back to the gym and will make time to work out     F/u   Lost 2 pounds since   Was trying to recover from covid.     Heart palpitations   Chronic issue   Did see cardiology in the past   Had a work up done - surgery was not recommended because she was in good health   Patient reports more recently she had more symptoms.       Current Outpatient Medications   Medication Sig Dispense Refill    phentermine (ADIPEX-P) 37.5 MG tablet Take 1 tablet by mouth every morning (before breakfast) for 30 days. Max Daily Amount: 37.5 mg 30 tablet 0    ketorolac (TORADOL) 10 MG tablet Take 1 tablet by mouth every 6 hours as needed for Pain 20 tablet 0    Rimegepant Sulfate 75 MG TBDP Take 1 tablet by mouth daily as needed (migraines) 30 tablet 0    SUMAtriptan (IMITREX) 50 MG tablet Take 1 tablet by mouth daily as needed for Migraine 9 tablet 5    valACYclovir (VALTREX) 500 MG tablet TAKE 1 TABLET BY MOUTH DAILY 90 tablet 2    mometasone (ELOCON) 0.1 % cream APPLY TOPICALLY TO AFFECTED AREA(S) ONCE DAILY 15 g 0    EPINEPHrine (EPIPEN) 0.3 MG/0.3ML SOAJ injection Use as directed for allergic reaction 2 each 2    albuterol sulfate  (90 Base) MCG/ACT inhaler Inhale 2 puffs into the

## 2024-02-15 ENCOUNTER — OFFICE VISIT (OUTPATIENT)
Dept: CARDIOLOGY CLINIC | Age: 49
End: 2024-02-15
Payer: COMMERCIAL

## 2024-02-15 VITALS
BODY MASS INDEX: 33.82 KG/M2 | SYSTOLIC BLOOD PRESSURE: 100 MMHG | HEART RATE: 87 BPM | OXYGEN SATURATION: 100 % | HEIGHT: 65 IN | DIASTOLIC BLOOD PRESSURE: 64 MMHG | WEIGHT: 203 LBS

## 2024-02-15 DIAGNOSIS — R42 DIZZINESS: ICD-10-CM

## 2024-02-15 DIAGNOSIS — R42 DIZZINESS: Primary | ICD-10-CM

## 2024-02-15 DIAGNOSIS — R00.2 PALPITATIONS: ICD-10-CM

## 2024-02-15 DIAGNOSIS — R55 NEAR SYNCOPE: ICD-10-CM

## 2024-02-15 LAB
ANION GAP SERPL CALCULATED.3IONS-SCNC: 12 MEQ/L (ref 9–15)
BUN SERPL-MCNC: 27 MG/DL (ref 6–20)
CALCIUM SERPL-MCNC: 9.7 MG/DL (ref 8.5–9.9)
CHLORIDE SERPL-SCNC: 102 MEQ/L (ref 95–107)
CO2 SERPL-SCNC: 25 MEQ/L (ref 20–31)
CREAT SERPL-MCNC: 0.82 MG/DL (ref 0.5–0.9)
ERYTHROCYTE [DISTWIDTH] IN BLOOD BY AUTOMATED COUNT: 17.6 % (ref 11.5–14.5)
GLUCOSE SERPL-MCNC: 92 MG/DL (ref 70–99)
HCT VFR BLD AUTO: 39 % (ref 37–47)
HGB BLD-MCNC: 11.9 G/DL (ref 12–16)
MAGNESIUM SERPL-MCNC: 2.3 MG/DL (ref 1.7–2.4)
MCH RBC QN AUTO: 23.9 PG (ref 27–31.3)
MCHC RBC AUTO-ENTMCNC: 30.5 % (ref 33–37)
MCV RBC AUTO: 78.5 FL (ref 79.4–94.8)
PLATELET # BLD AUTO: 322 K/UL (ref 130–400)
POTASSIUM SERPL-SCNC: 5.6 MEQ/L (ref 3.4–4.9)
RBC # BLD AUTO: 4.97 M/UL (ref 4.2–5.4)
SODIUM SERPL-SCNC: 139 MEQ/L (ref 135–144)
TSH REFLEX: 2.14 UIU/ML (ref 0.44–3.86)
WBC # BLD AUTO: 7.5 K/UL (ref 4.8–10.8)

## 2024-02-15 PROCEDURE — G8417 CALC BMI ABV UP PARAM F/U: HCPCS | Performed by: INTERNAL MEDICINE

## 2024-02-15 PROCEDURE — 99204 OFFICE O/P NEW MOD 45 MIN: CPT | Performed by: INTERNAL MEDICINE

## 2024-02-15 PROCEDURE — G8427 DOCREV CUR MEDS BY ELIG CLIN: HCPCS | Performed by: INTERNAL MEDICINE

## 2024-02-15 PROCEDURE — G8484 FLU IMMUNIZE NO ADMIN: HCPCS | Performed by: INTERNAL MEDICINE

## 2024-02-15 PROCEDURE — 3017F COLORECTAL CA SCREEN DOC REV: CPT | Performed by: INTERNAL MEDICINE

## 2024-02-15 PROCEDURE — 1036F TOBACCO NON-USER: CPT | Performed by: INTERNAL MEDICINE

## 2024-02-15 PROCEDURE — 93000 ELECTROCARDIOGRAM COMPLETE: CPT | Performed by: INTERNAL MEDICINE

## 2024-02-15 NOTE — PROGRESS NOTES
history of multiple MIs starting at age 32.  He also had a bypass surgery in his 40s.  Father also with history of AICD.  Paternal uncle also with history of MIs.    Current medications:   Current Outpatient Medications   Medication Sig Dispense Refill    phentermine (ADIPEX-P) 37.5 MG tablet Take 1 tablet by mouth every morning (before breakfast) for 30 days. Max Daily Amount: 37.5 mg 30 tablet 0    ketorolac (TORADOL) 10 MG tablet Take 1 tablet by mouth every 6 hours as needed for Pain 20 tablet 0    Rimegepant Sulfate 75 MG TBDP Take 1 tablet by mouth daily as needed (migraines) 30 tablet 0    SUMAtriptan (IMITREX) 50 MG tablet Take 1 tablet by mouth daily as needed for Migraine 9 tablet 5    valACYclovir (VALTREX) 500 MG tablet TAKE 1 TABLET BY MOUTH DAILY 90 tablet 2    mometasone (ELOCON) 0.1 % cream APPLY TOPICALLY TO AFFECTED AREA(S) ONCE DAILY 15 g 0    EPINEPHrine (EPIPEN) 0.3 MG/0.3ML SOAJ injection Use as directed for allergic reaction 2 each 2    albuterol sulfate  (90 Base) MCG/ACT inhaler Inhale 2 puffs into the lungs every 4 hours as needed for Wheezing or Shortness of Breath 1 each 3     No current facility-administered medications for this visit.       Allergies: Mushroom extract complex, Iodine, Shellfish-derived products, Vicodin [hydrocodone-acetaminophen], and Formaldehyde     Review of Systems   General: Fatigued at times.  Recent COVID infection.  Cardiovascular: See HPI. No orthopnea or PND.   Respiratory: Dyspnea is present with moderate degrees of activity,.  Gastrointestinal: No melena or hematochezia.   Genitourinary: No hematuria.   Hematological: No easy bruising or bleeding.  History of anemia. + Pica/chews ice   Vascular: No lower extremity edema or claudication.  Neurological: No TIA or CVA symptoms. No paresthesias.  + Visual disturbances with episodes.  + Headaches  Musculoskeletal: No chest wall pain.  Psychiatric: No anxiety.   *All other systems were reviewed and found

## 2024-02-15 NOTE — PATIENT INSTRUCTIONS
2 week heart monitor   Echocardiogram  Get blood work done  Continue current medications  Follow up in 4 weeks.   Holter monitor for 2 weeks.   Any signs of irritation or the monitor falls off give us a call so we can assist you.   After the two weeks take the monitor off place it back into the box, put your address on the return label. Place directly into your mail box or [facility]     Any symptoms within the heart there is the button you can press and document in the booklet.   Please give us a call if you may have any questions. [phone].

## 2024-02-20 RX ORDER — POLYETHYLENE GLYCOL 3350, SODIUM CHLORIDE, SODIUM BICARBONATE, POTASSIUM CHLORIDE 420; 11.2; 5.72; 1.48 G/4L; G/4L; G/4L; G/4L
4000 POWDER, FOR SOLUTION ORAL ONCE
Qty: 4000 ML | Refills: 0 | Status: SHIPPED | OUTPATIENT
Start: 2024-02-20 | End: 2024-02-20

## 2024-02-23 ENCOUNTER — HOSPITAL ENCOUNTER (OUTPATIENT)
Age: 49
Setting detail: OUTPATIENT SURGERY
Discharge: HOME | End: 2024-02-23
Attending: SPECIALIST | Admitting: SPECIALIST
Payer: COMMERCIAL

## 2024-02-23 ENCOUNTER — ANESTHESIA EVENT (OUTPATIENT)
Dept: ENDOSCOPY | Age: 49
End: 2024-02-23
Payer: COMMERCIAL

## 2024-02-23 ENCOUNTER — ANESTHESIA (OUTPATIENT)
Dept: ENDOSCOPY | Age: 49
End: 2024-02-23
Payer: COMMERCIAL

## 2024-02-23 VITALS
WEIGHT: 205 LBS | HEIGHT: 65 IN | TEMPERATURE: 97.2 F | BODY MASS INDEX: 34.16 KG/M2 | HEART RATE: 59 BPM | RESPIRATION RATE: 18 BRPM | OXYGEN SATURATION: 100 % | SYSTOLIC BLOOD PRESSURE: 111 MMHG | DIASTOLIC BLOOD PRESSURE: 75 MMHG

## 2024-02-23 PROBLEM — Z12.11 SCREENING FOR MALIGNANT NEOPLASM OF COLON: Status: ACTIVE | Noted: 2024-02-23

## 2024-02-23 PROCEDURE — 3700000001 HC ADD 15 MINUTES (ANESTHESIA): Performed by: SPECIALIST

## 2024-02-23 PROCEDURE — 3700000000 HC ANESTHESIA ATTENDED CARE: Performed by: SPECIALIST

## 2024-02-23 PROCEDURE — 6370000000 HC RX 637 (ALT 250 FOR IP): Performed by: SPECIALIST

## 2024-02-23 PROCEDURE — 3609027000 HC COLONOSCOPY: Performed by: SPECIALIST

## 2024-02-23 PROCEDURE — 2709999900 HC NON-CHARGEABLE SUPPLY: Performed by: SPECIALIST

## 2024-02-23 PROCEDURE — 7100000011 HC PHASE II RECOVERY - ADDTL 15 MIN: Performed by: SPECIALIST

## 2024-02-23 PROCEDURE — 45378 DIAGNOSTIC COLONOSCOPY: CPT | Performed by: SPECIALIST

## 2024-02-23 PROCEDURE — 7100000010 HC PHASE II RECOVERY - FIRST 15 MIN: Performed by: SPECIALIST

## 2024-02-23 PROCEDURE — 2580000003 HC RX 258: Performed by: SPECIALIST

## 2024-02-23 RX ORDER — SODIUM CHLORIDE 9 MG/ML
INJECTION, SOLUTION INTRAVENOUS
Status: DISCONTINUED
Start: 2024-02-23 | End: 2024-02-23 | Stop reason: HOSPADM

## 2024-02-23 RX ORDER — PROPOFOL 10 MG/ML
INJECTION, EMULSION INTRAVENOUS CONTINUOUS PRN
Status: DISCONTINUED | OUTPATIENT
Start: 2024-02-23 | End: 2024-02-23 | Stop reason: SDUPTHER

## 2024-02-23 RX ORDER — MAGNESIUM HYDROXIDE 1200 MG/15ML
LIQUID ORAL PRN
Status: DISCONTINUED | OUTPATIENT
Start: 2024-02-23 | End: 2024-02-23 | Stop reason: ALTCHOICE

## 2024-02-23 RX ORDER — SIMETHICONE 40MG/0.6ML
SUSPENSION, DROPS(FINAL DOSAGE FORM)(ML) ORAL
Status: DISCONTINUED
Start: 2024-02-23 | End: 2024-02-23 | Stop reason: HOSPADM

## 2024-02-23 RX ORDER — SIMETHICONE 40MG/0.6ML
SUSPENSION, DROPS(FINAL DOSAGE FORM)(ML) ORAL PRN
Status: DISCONTINUED | OUTPATIENT
Start: 2024-02-23 | End: 2024-02-23 | Stop reason: ALTCHOICE

## 2024-02-23 RX ORDER — SODIUM CHLORIDE 9 MG/ML
INJECTION, SOLUTION INTRAVENOUS CONTINUOUS
Status: DISCONTINUED | OUTPATIENT
Start: 2024-02-23 | End: 2024-02-23 | Stop reason: HOSPADM

## 2024-02-23 RX ORDER — SODIUM CHLORIDE 0.9 % (FLUSH) 0.9 %
5-40 SYRINGE (ML) INJECTION EVERY 12 HOURS SCHEDULED
Status: DISCONTINUED | OUTPATIENT
Start: 2024-02-23 | End: 2024-02-23 | Stop reason: HOSPADM

## 2024-02-23 RX ORDER — SODIUM CHLORIDE 0.9 % (FLUSH) 0.9 %
5-40 SYRINGE (ML) INJECTION PRN
Status: DISCONTINUED | OUTPATIENT
Start: 2024-02-23 | End: 2024-02-23 | Stop reason: HOSPADM

## 2024-02-23 RX ORDER — SODIUM CHLORIDE 9 MG/ML
INJECTION, SOLUTION INTRAVENOUS PRN
Status: DISCONTINUED | OUTPATIENT
Start: 2024-02-23 | End: 2024-02-23 | Stop reason: HOSPADM

## 2024-02-23 RX ADMIN — SODIUM CHLORIDE: 9 INJECTION, SOLUTION INTRAVENOUS at 07:09

## 2024-02-23 RX ADMIN — PROPOFOL 180 MCG/KG/MIN: 10 INJECTION, EMULSION INTRAVENOUS at 07:37

## 2024-02-23 ASSESSMENT — PAIN - FUNCTIONAL ASSESSMENT: PAIN_FUNCTIONAL_ASSESSMENT: 0-10

## 2024-02-23 NOTE — ANESTHESIA PRE PROCEDURE
10:43 AM    HGB 11.9 02/15/2024 10:43 AM    HCT 39.0 02/15/2024 10:43 AM    MCV 78.5 02/15/2024 10:43 AM    RDW 17.6 02/15/2024 10:43 AM     02/15/2024 10:43 AM       CMP:   Lab Results   Component Value Date/Time     02/15/2024 10:43 AM    K 5.6 02/15/2024 10:43 AM     02/15/2024 10:43 AM    CO2 25 02/15/2024 10:43 AM    BUN 27 02/15/2024 10:43 AM    CREATININE 0.82 02/15/2024 10:43 AM    GFRAA >60.0 10/27/2017 09:51 AM    LABGLOM >60.0 02/15/2024 10:43 AM    GLUCOSE 92 02/15/2024 10:43 AM    PROT 7.2 10/27/2017 09:51 AM    CALCIUM 9.7 02/15/2024 10:43 AM    BILITOT 0.3 10/27/2017 09:51 AM    ALKPHOS 48 10/27/2017 09:51 AM    AST 19 10/27/2017 09:51 AM    ALT 17 10/27/2017 09:51 AM       POC Tests: No results for input(s): \"POCGLU\", \"POCNA\", \"POCK\", \"POCCL\", \"POCBUN\", \"POCHEMO\", \"POCHCT\" in the last 72 hours.    Coags: No results found for: \"PROTIME\", \"INR\", \"APTT\"    HCG (If Applicable):   Lab Results   Component Value Date    PREGTESTUR NEGATIVE 06/16/2021        ABGs: No results found for: \"PHART\", \"PO2ART\", \"WHB2VSC\", \"UBC5ENP\", \"BEART\", \"I1KVEYUI\"     Type & Screen (If Applicable):  No results found for: \"LABABO\", \"LABRH\"    Drug/Infectious Status (If Applicable):  No results found for: \"HIV\", \"HEPCAB\"    COVID-19 Screening (If Applicable): No results found for: \"COVID19\"        Anesthesia Evaluation  Patient summary reviewed and Nursing notes reviewed  Airway: Mallampati: II  TM distance: >3 FB   Neck ROM: full  Mouth opening: > = 3 FB   Dental: normal exam         Pulmonary:                              Cardiovascular:                      Neuro/Psych:               GI/Hepatic/Renal:             Endo/Other:                     Abdominal:             Vascular:          Other Findings:             Anesthesia Plan      MAC     ASA 2             Anesthetic plan and risks discussed with patient.    Use of blood products discussed with patient whom consented to blood products.

## 2024-02-23 NOTE — H&P
Patient Name: Evon Hogue  : 1975  MRN: [id number]  DATE: 24      ENDOSCOPY  History and Physical    Procedure:    [] Diagnostic Colonoscopy       [x] Screening Colonoscopy  [] EGD      [] ERCP      [] EUS       [] Other    [x] Previous office notes/History and Physical reviewed from the patients chart. Please see EMR for further details of HPI. I have examined the patient's status immediately prior to the procedure and:      Indications/HPI:    []Abdominal Pain  []Cancer- GI/Lung  []Fhx of colon CA/polyps  []History of Polyps  []Rust’s   []Melena  []Abnormal Imaging  []Dysphagia    []Persistent Pneumonia  []Anemia  []Food Impaction  []History of Polyps  []GI Bleed  []Pulmonary nodule/Mass  []Change in bowel habits []Heartburn/Reflux  []Rectal Bleed (BRBPR)  []Chest Pain - Non Cardiac []Heme (+) Stoo  l[]Ulcers  []Constipation  []Hemoptysis   []Varices  []Diarrhea  []Hypoxemia  []Nausea/Vomiting  []Screening   []Crohns/Colitis  []Other:     Anesthesia:   [x] MAC [] Moderate Sedation   [] General   [] None     ROS: 12 pt Review of Symptoms was negative unless mentioned above    Medications:   Prior to Admission medications    Medication Sig Start Date End Date Taking? Authorizing Provider   phentermine (ADIPEX-P) 37.5 MG tablet Take 1 tablet by mouth every morning (before breakfast) for 30 days. Max Daily Amount: 37.5 mg 2/8/24 3/9/24  Beth Atkinson MD   ketorolac (TORADOL) 10 MG tablet Take 1 tablet by mouth every 6 hours as needed for Pain 24  Beth Atkinson MD   Rimegepant Sulfate 75 MG TBDP Take 1 tablet by mouth daily as needed (migraines)  Patient not taking: Reported on 2024   Beth Atkinson MD   SUMAtriptan (IMITREX) 50 MG tablet Take 1 tablet by mouth daily as needed for Migraine 23   Beth Atkinson MD   valACYclovir (VALTREX) 500 MG tablet TAKE 1 TABLET BY MOUTH DAILY 23   Beth Atkinson MD   mometasone (ELOCON) 0.1 %

## 2024-02-23 NOTE — ANESTHESIA POSTPROCEDURE EVALUATION
Department of Anesthesiology  Postprocedure Note    Patient: Evon Hogue  MRN: [id number]  YOB: 1975  Date of evaluation: 2/23/2024    Procedure Summary       Date: 02/23/24 Room / Location: [facility] OR 01 / [facility]    Anesthesia Start: 0731 Anesthesia Stop: 0754    Procedure: COLORECTAL CANCER SCREENING, NOT HIGH RISK Diagnosis:       Colon cancer screening      (Colon cancer screening [Z12.11])    Surgeons: Wilma Sheets MD Responsible Provider: Terese Fisher APRN - CRNA    Anesthesia Type: MAC ASA Status: 2            Anesthesia Type: No value filed.    Paulette Phase I: Apulette Score: 10    Paulette Phase II:      Anesthesia Post Evaluation    Patient location during evaluation: bedside  Patient participation: complete - patient participated  Level of consciousness: awake and alert  Pain score: 0  Airway patency: patent  Nausea & Vomiting: no nausea and no vomiting  Cardiovascular status: blood pressure returned to baseline and hemodynamically stable  Respiratory status: acceptable, spontaneous ventilation, nonlabored ventilation and nasal cannula  Hydration status: euvolemic  Pain management: adequate        No notable events documented.

## 2024-03-20 ENCOUNTER — HOSPITAL ENCOUNTER (OUTPATIENT)
Age: 49
Discharge: HOME | End: 2024-03-22
Payer: COMMERCIAL

## 2024-03-20 VITALS
WEIGHT: 205 LBS | HEIGHT: 65 IN | SYSTOLIC BLOOD PRESSURE: 111 MMHG | BODY MASS INDEX: 34.16 KG/M2 | DIASTOLIC BLOOD PRESSURE: 75 MMHG

## 2024-03-20 DIAGNOSIS — R55 NEAR SYNCOPE: ICD-10-CM

## 2024-03-20 DIAGNOSIS — R42 DIZZINESS: ICD-10-CM

## 2024-03-20 DIAGNOSIS — R00.2 PALPITATIONS: ICD-10-CM

## 2024-03-20 PROCEDURE — 93306 TTE W/DOPPLER COMPLETE: CPT

## 2024-03-21 LAB
ECHO AO ROOT DIAM: 3 CM
ECHO AO ROOT INDEX: 1.5 CM/M2
ECHO AV AREA PEAK VELOCITY: 2.6 CM2
ECHO AV AREA VTI: 2.1 CM2
ECHO AV AREA/BSA PEAK VELOCITY: 1.3 CM2/M2
ECHO AV AREA/BSA VTI: 1.1 CM2/M2
ECHO AV MEAN GRADIENT: 5 MMHG
ECHO AV MEAN VELOCITY: 1 M/S
ECHO AV PEAK GRADIENT: 8 MMHG
ECHO AV PEAK VELOCITY: 1.4 M/S
ECHO AV VELOCITY RATIO: 0.79
ECHO AV VTI: 36 CM
ECHO BSA: 2.06 M2
ECHO EST RA PRESSURE: 3 MMHG
ECHO LA DIAMETER INDEX: 1.85 CM/M2
ECHO LA DIAMETER: 3.7 CM
ECHO LA TO AORTIC ROOT RATIO: 1.23
ECHO LA VOL A-L A4C: 48 ML (ref 22–52)
ECHO LA VOL MOD A4C: 43 ML (ref 22–52)
ECHO LA VOLUME INDEX A-L A4C: 24 ML/M2 (ref 16–34)
ECHO LA VOLUME INDEX MOD A4C: 22 ML/M2 (ref 16–34)
ECHO LV E' LATERAL VELOCITY: 18 CM/S
ECHO LV E' SEPTAL VELOCITY: 9 CM/S
ECHO LV EDV A2C: 91 ML
ECHO LV EDV A4C: 71 ML
ECHO LV EDV BP: 82 ML (ref 56–104)
ECHO LV EDV INDEX A4C: 36 ML/M2
ECHO LV EDV INDEX BP: 41 ML/M2
ECHO LV EDV NDEX A2C: 46 ML/M2
ECHO LV EJECTION FRACTION A2C: 64 %
ECHO LV EJECTION FRACTION A4C: 60 %
ECHO LV EJECTION FRACTION BIPLANE: 61 % (ref 55–100)
ECHO LV ESV A2C: 33 ML
ECHO LV ESV A4C: 29 ML
ECHO LV ESV BP: 32 ML (ref 19–49)
ECHO LV ESV INDEX A2C: 17 ML/M2
ECHO LV ESV INDEX A4C: 15 ML/M2
ECHO LV ESV INDEX BP: 16 ML/M2
ECHO LV FRACTIONAL SHORTENING: 33 % (ref 28–44)
ECHO LV INTERNAL DIMENSION DIASTOLE INDEX: 2.3 CM/M2
ECHO LV INTERNAL DIMENSION DIASTOLIC: 4.6 CM (ref 3.9–5.3)
ECHO LV INTERNAL DIMENSION SYSTOLIC INDEX: 1.55 CM/M2
ECHO LV INTERNAL DIMENSION SYSTOLIC: 3.1 CM
ECHO LV IVSD: 1 CM (ref 0.6–0.9)
ECHO LV IVSS: 1.4 CM
ECHO LV MASS 2D: 148.1 G (ref 67–162)
ECHO LV MASS INDEX 2D: 74.1 G/M2 (ref 43–95)
ECHO LV POSTERIOR WALL DIASTOLIC: 0.9 CM (ref 0.6–0.9)
ECHO LV POSTERIOR WALL SYSTOLIC: 1.4 CM
ECHO LV RELATIVE WALL THICKNESS RATIO: 0.39
ECHO LVOT AREA: 3.1 CM2
ECHO LVOT AV VTI INDEX: 0.66
ECHO LVOT DIAM: 2 CM
ECHO LVOT MEAN GRADIENT: 3 MMHG
ECHO LVOT PEAK GRADIENT: 5 MMHG
ECHO LVOT PEAK VELOCITY: 1.1 M/S
ECHO LVOT STROKE VOLUME INDEX: 37.5 ML/M2
ECHO LVOT SV: 75 ML
ECHO LVOT VTI: 23.9 CM
ECHO MV A VELOCITY: 0.8 M/S
ECHO MV E DECELERATION TIME (DT): 215.5 MS
ECHO MV E VELOCITY: 0.94 M/S
ECHO MV E/A RATIO: 1.18
ECHO MV E/E' LATERAL: 5.22
ECHO MV E/E' RATIO (AVERAGED): 7.83
ECHO PV ACCELERATION TIME (AT): 72.3 MS
ECHO PV MAX VELOCITY: 1 M/S
ECHO PV PEAK GRADIENT: 4 MMHG
ECHO PVEIN A DURATION: 108.5 MS
ECHO PVEIN A VELOCITY: 0.3 M/S
ECHO PVEIN PEAK D VELOCITY: 0.4 M/S
ECHO PVEIN PEAK S VELOCITY: 0.6 M/S
ECHO PVEIN S/D RATIO: 1.5
ECHO RIGHT VENTRICULAR SYSTOLIC PRESSURE (RVSP): 24 MMHG
ECHO RV TAPSE: 2.6 CM (ref 1.7–?)
ECHO TV REGURGITANT MAX VELOCITY: 2.3 M/S
ECHO TV REGURGITANT PEAK GRADIENT: 21 MMHG

## 2024-03-21 PROCEDURE — 93306 TTE W/DOPPLER COMPLETE: CPT | Performed by: INTERNAL MEDICINE

## 2024-03-22 ENCOUNTER — OFFICE VISIT (OUTPATIENT)
Dept: FAMILY MEDICINE CLINIC | Age: 49
End: 2024-03-22
Payer: COMMERCIAL

## 2024-03-22 VITALS
OXYGEN SATURATION: 100 % | WEIGHT: 207 LBS | HEART RATE: 90 BPM | BODY MASS INDEX: 34.49 KG/M2 | TEMPERATURE: 97.5 F | SYSTOLIC BLOOD PRESSURE: 108 MMHG | DIASTOLIC BLOOD PRESSURE: 82 MMHG | HEIGHT: 65 IN

## 2024-03-22 DIAGNOSIS — E66.09 CLASS 1 OBESITY DUE TO EXCESS CALORIES WITH BODY MASS INDEX (BMI) OF 34.0 TO 34.9 IN ADULT, UNSPECIFIED WHETHER SERIOUS COMORBIDITY PRESENT: ICD-10-CM

## 2024-03-22 DIAGNOSIS — R00.2 HEART PALPITATIONS: ICD-10-CM

## 2024-03-22 DIAGNOSIS — U09.9 POST-COVID SYNDROME: Primary | ICD-10-CM

## 2024-03-22 DIAGNOSIS — R42 DIZZINESS: ICD-10-CM

## 2024-03-22 PROCEDURE — 1036F TOBACCO NON-USER: CPT | Performed by: FAMILY MEDICINE

## 2024-03-22 PROCEDURE — 3017F COLORECTAL CA SCREEN DOC REV: CPT | Performed by: FAMILY MEDICINE

## 2024-03-22 PROCEDURE — G8484 FLU IMMUNIZE NO ADMIN: HCPCS | Performed by: FAMILY MEDICINE

## 2024-03-22 PROCEDURE — G8417 CALC BMI ABV UP PARAM F/U: HCPCS | Performed by: FAMILY MEDICINE

## 2024-03-22 PROCEDURE — 99213 OFFICE O/P EST LOW 20 MIN: CPT | Performed by: FAMILY MEDICINE

## 2024-03-22 PROCEDURE — G8427 DOCREV CUR MEDS BY ELIG CLIN: HCPCS | Performed by: FAMILY MEDICINE

## 2024-03-22 RX ORDER — POLYETHYLENE GLYCOL 3350, SODIUM CHLORIDE, SODIUM BICARBONATE, POTASSIUM CHLORIDE 420; 11.2; 5.72; 1.48 G/4L; G/4L; G/4L; G/4L
POWDER, FOR SOLUTION ORAL
COMMUNITY
Start: 2024-02-20

## 2024-03-22 NOTE — PROGRESS NOTES
History     Socioeconomic History    Marital status:      Spouse name: Not on file    Number of children: Not on file    Years of education: Not on file    Highest education level: Not on file   Occupational History    Not on file   Tobacco Use    Smoking status: Never    Smokeless tobacco: Never   Substance and Sexual Activity    Alcohol use: Yes     Comment: Occasionally. Not daily.    Drug use: No    Sexual activity: Yes     Partners: Male   Other Topics Concern    Not on file   Social History Narrative    Not on file     Social Determinants of Health     Financial Resource Strain: Low Risk  (4/17/2023)    Overall Financial Resource Strain (CARDIA)     Difficulty of Paying Living Expenses: Not hard at all   Food Insecurity: Not on file (4/17/2023)   Transportation Needs: Unknown (4/17/2023)    PRAPARE - Transportation     Lack of Transportation (Medical): Not on file     Lack of Transportation (Non-Medical): No   Physical Activity: Not on file   Stress: Not on file   Social Connections: Not on file   Intimate Partner Violence: Not on file   Housing Stability: Unknown (4/17/2023)    Housing Stability Vital Sign     Unable to Pay for Housing in the Last Year: Not on file     Number of Places Lived in the Last Year: Not on file     Unstable Housing in the Last Year: No        /82   Pulse 90   Temp 97.5 °F (36.4 °C)   Ht 1.651 m (5' 5\")   Wt 93.9 kg (207 lb)   SpO2 100%   BMI 34.45 kg/m²        Physical Exam:    General appearance - alert, well appearing, and in no distress, obese  Mental Status - alert, oriented to person, place, and time  Eyes - pupils equal and reactive, extraocular eye movements intact   Ears - bilateral TM's and external ear canals normal   Nose - normal and patent, no erythema, discharge or polyps   Sinuses - Normal paranasal sinuses without tenderness   Throat - mucous membranes moist, pharynx normal without lesions   Neck - supple, no significant adenopathy   Thyroid -

## 2024-03-24 PROBLEM — Z12.11 SCREENING FOR MALIGNANT NEOPLASM OF COLON: Status: RESOLVED | Noted: 2024-02-23 | Resolved: 2024-03-24

## 2024-03-25 ENCOUNTER — TELEPHONE (OUTPATIENT)
Dept: FAMILY MEDICINE CLINIC | Age: 49
End: 2024-03-25

## 2024-03-25 NOTE — TELEPHONE ENCOUNTER
PT called - states she gave Sara some information on a [facility] on Friday. Sara was to look into it and run it by Dr Tobias. PT is calling checking on status

## 2024-03-25 NOTE — TELEPHONE ENCOUNTER
Please confirm with hernando if it is for [facility]?   Wanted to confirm this before we fax it    Thanks

## 2024-03-28 ENCOUNTER — OFFICE VISIT (OUTPATIENT)
Dept: CARDIOLOGY CLINIC | Age: 49
End: 2024-03-28
Payer: COMMERCIAL

## 2024-03-28 VITALS
HEART RATE: 66 BPM | SYSTOLIC BLOOD PRESSURE: 108 MMHG | DIASTOLIC BLOOD PRESSURE: 76 MMHG | WEIGHT: 208 LBS | HEIGHT: 65 IN | BODY MASS INDEX: 34.66 KG/M2

## 2024-03-28 DIAGNOSIS — I47.10 PSVT (PAROXYSMAL SUPRAVENTRICULAR TACHYCARDIA) (HCC): Primary | ICD-10-CM

## 2024-03-28 PROCEDURE — G8484 FLU IMMUNIZE NO ADMIN: HCPCS | Performed by: INTERNAL MEDICINE

## 2024-03-28 PROCEDURE — G8417 CALC BMI ABV UP PARAM F/U: HCPCS | Performed by: INTERNAL MEDICINE

## 2024-03-28 PROCEDURE — 99214 OFFICE O/P EST MOD 30 MIN: CPT | Performed by: INTERNAL MEDICINE

## 2024-03-28 PROCEDURE — 1036F TOBACCO NON-USER: CPT | Performed by: INTERNAL MEDICINE

## 2024-03-28 PROCEDURE — G8427 DOCREV CUR MEDS BY ELIG CLIN: HCPCS | Performed by: INTERNAL MEDICINE

## 2024-03-28 PROCEDURE — 3017F COLORECTAL CA SCREEN DOC REV: CPT | Performed by: INTERNAL MEDICINE

## 2024-03-28 NOTE — PROGRESS NOTES
3/28/2024    Beth Atkinson MD  [address] [facility] [id number]    RE: Evon Hogue   : 1975     Dear Beth Sam MD :    As you are well aware, Ms. Hogue is a pleasant 49 y.o.  female who was kindly referred to me for evaluation of palpitations.  She reports a longstanding history of palpitations.  She was diagnosed with COVID 2024 and since then has experienced worsening of palpitations especially over the last 2 weeks.  She reports several episodes of \"racing/skipping\" and \"hard fast beats\" that now occur on a daily basis.  The episodes start suddenly and can last several seconds before gradually resolving.  She reports associated dizziness, shortness of breath, and visual disturbances.  She reports near syncope but no portia syncopal episodes.  She has had episodes while driving so she is trying to avoid traveling by herself.  She does report associated chest discomfort described as \"pressure\" with the episodes but not with any physical activity like with her cardio exercises.  No associated worsening exertional dyspnea.  She saw Dr. Caicedo in  and at that time was noted to have PACs, PVCs, and tachycardia with heart rates up in the 150s.  She was on carvedilol at that time but has since stopped has symptoms improved on their own up until recently.      3/28/2024: Patient here for follow-up of palpitations and recent cardiac test results.  She continues to report intermittent palpitations which come and go.  She continues to have intermittent dizziness but no near-syncope or syncope.  No chest pain or worsening exertional dyspnea with day-to-day activities.  Her recent echocardiogram showed preserved LV function.  Recent event monitor showed 3 short salvos of paroxysmal SVT lasting up to 7.8 seconds in duration and maximum heart rate of 200 bpm.  She reported 52 symptomatic episodes of which only 1 correlated with the SVT episode.  The remaining

## 2024-04-03 ENCOUNTER — TELEPHONE (OUTPATIENT)
Dept: FAMILY MEDICINE CLINIC | Age: 49
End: 2024-04-03

## 2024-04-05 NOTE — TELEPHONE ENCOUNTER
PT calling checking on status of   Referral to ENT and/or Neuro   Please let PT know if this can be done or does she need to wait to be seen,    She needs to have referral before she can return to work

## 2024-04-06 DIAGNOSIS — R51.9 ACHING HEADACHE: ICD-10-CM

## 2024-04-06 DIAGNOSIS — R42 DIZZINESS: Primary | ICD-10-CM

## 2024-04-19 DIAGNOSIS — L30.9 DERMATITIS: ICD-10-CM

## 2024-04-20 SDOH — ECONOMIC STABILITY: FOOD INSECURITY: WITHIN THE PAST 12 MONTHS, YOU WORRIED THAT YOUR FOOD WOULD RUN OUT BEFORE YOU GOT MONEY TO BUY MORE.: NEVER TRUE

## 2024-04-20 SDOH — ECONOMIC STABILITY: FOOD INSECURITY: WITHIN THE PAST 12 MONTHS, THE FOOD YOU BOUGHT JUST DIDN'T LAST AND YOU DIDN'T HAVE MONEY TO GET MORE.: NEVER TRUE

## 2024-04-20 SDOH — ECONOMIC STABILITY: INCOME INSECURITY: HOW HARD IS IT FOR YOU TO PAY FOR THE VERY BASICS LIKE FOOD, HOUSING, MEDICAL CARE, AND HEATING?: PATIENT DECLINED

## 2024-04-21 NOTE — TELEPHONE ENCOUNTER
Future Appointments    Encounter Information   Provider Department Appt Notes   4/22/2024 Beth Atkinson MD [facility] 4 wk f/u // [facility]          UNABLE TO REACH PT TO CONFIRM: LEFT VM MESSAGE/MYCHART     Past Visits    Date Provider Specialty Visit Type Primary Dx   03/28/2024 Tonja Mcfarland, DO Cardiology Office Visit PSVT (paroxysmal supraventricular tachycardia)   03/22/2024 Beth Atkinson MD Family Medicine Office Visit Post-COVID syndrome   02/23/2024 Wilma Sheets MD Endoscopy Surgery    02/15/2024 Tonja Mcfarland DO Cardiology Office Visit Dizziness   02/08/2024 Beth Atkinson MD Family Medicine Office Visit Post-COVID syndrome

## 2024-04-22 ENCOUNTER — TELEPHONE (OUTPATIENT)
Dept: FAMILY MEDICINE CLINIC | Age: 49
End: 2024-04-22

## 2024-04-22 ENCOUNTER — OFFICE VISIT (OUTPATIENT)
Dept: FAMILY MEDICINE CLINIC | Age: 49
End: 2024-04-22
Payer: COMMERCIAL

## 2024-04-22 VITALS
HEART RATE: 75 BPM | SYSTOLIC BLOOD PRESSURE: 118 MMHG | HEIGHT: 65 IN | BODY MASS INDEX: 33.32 KG/M2 | OXYGEN SATURATION: 100 % | WEIGHT: 200 LBS | TEMPERATURE: 97 F | DIASTOLIC BLOOD PRESSURE: 78 MMHG

## 2024-04-22 DIAGNOSIS — E66.09 CLASS 1 OBESITY DUE TO EXCESS CALORIES WITH BODY MASS INDEX (BMI) OF 33.0 TO 33.9 IN ADULT, UNSPECIFIED WHETHER SERIOUS COMORBIDITY PRESENT: Primary | ICD-10-CM

## 2024-04-22 PROCEDURE — 99213 OFFICE O/P EST LOW 20 MIN: CPT | Performed by: FAMILY MEDICINE

## 2024-04-22 PROCEDURE — 1036F TOBACCO NON-USER: CPT | Performed by: FAMILY MEDICINE

## 2024-04-22 PROCEDURE — G8427 DOCREV CUR MEDS BY ELIG CLIN: HCPCS | Performed by: FAMILY MEDICINE

## 2024-04-22 PROCEDURE — 3017F COLORECTAL CA SCREEN DOC REV: CPT | Performed by: FAMILY MEDICINE

## 2024-04-22 PROCEDURE — G8417 CALC BMI ABV UP PARAM F/U: HCPCS | Performed by: FAMILY MEDICINE

## 2024-04-22 RX ORDER — MOMETASONE FUROATE 1 MG/G
CREAM TOPICAL
Qty: 15 G | Refills: 0 | Status: SHIPPED | OUTPATIENT
Start: 2024-04-22

## 2024-04-22 RX ORDER — TIRZEPATIDE 2.5 MG/.5ML
2.5 INJECTION, SOLUTION SUBCUTANEOUS WEEKLY
COMMUNITY
Start: 2024-04-22

## 2024-04-22 NOTE — TELEPHONE ENCOUNTER
The regulate as possible as strength is still considered as shellfish.  She is to be on safe side, I would recommend patient stay away from shrimp to avoid unwanted effects.

## 2024-04-22 NOTE — PROGRESS NOTES
or organomegaly   Back exam - full range of motion, no tenderness, palpable spasm or pain on motion   Neurological - alert, oriented, normal speech, no focal findings or movement disorder noted   Musculoskeletal -negative exam.  Extremities - peripheral pulses normal, no pedal edema, no clubbing or cyanosis   Skin - negative        Labs   TSH   Date Value Ref Range Status   02/15/2024 2.140 0.440 - 3.860 uIU/mL Final     Comment:     Free T4 will automatically reflex  with a TSH result of  <0.270 or >4.200     10/18/2022 2.570 0.440 - 3.860 uIU/mL Final   06/16/2021 2.380 0.440 - 3.860 uIU/mL Final   10/27/2017 2.720 0.270 - 4.200 uIU/mL Final     TSH   Date Value Ref Range Status   11/25/2014 2.620 0.270 - 4.200 uIU/mL Final   04/12/2013 1.926 0.550 - 4.780 uIU/mL Final     Lab Results   Component Value Date     02/15/2024    K 5.6 (H) 02/15/2024     02/15/2024    CO2 25 02/15/2024    BUN 27 (H) 02/15/2024    CREATININE 0.82 02/15/2024    GLUCOSE 92 02/15/2024    CALCIUM 9.7 02/15/2024    PROT 7.2 10/27/2017    BILITOT 0.3 10/27/2017    ALKPHOS 48 10/27/2017    AST 19 10/27/2017    ALT 17 10/27/2017    LABGLOM >60.0 02/15/2024    GFRAA >60.0 10/27/2017    GLOB 3.2 10/27/2017     Lab Results   Component Value Date    WBC 7.5 02/15/2024    HGB 11.9 (L) 02/15/2024    HCT 39.0 02/15/2024    MCV 78.5 (L) 02/15/2024     02/15/2024     Lab Results   Component Value Date    LABA1C 5.6 10/18/2022     No results found for: \"EAG\"      A/P: Evon MAKI Hogue 49 y.o. female presenting for    1. Class 1 obesity due to excess calories with body mass index (BMI) of 33.0 to 33.9 in adult, unspecified whether serious comorbidity present  Doing well on the mounjaro at the lost dose for the last 1 month. Will continue at this time.               Please note, this report has been partially produced using speech recognition software  and may cause  and /or contain errors related to that system including grammar,

## 2024-06-06 DIAGNOSIS — B00.9 HSV (HERPES SIMPLEX VIRUS) INFECTION: ICD-10-CM

## 2024-06-06 RX ORDER — VALACYCLOVIR HYDROCHLORIDE 500 MG/1
TABLET, FILM COATED ORAL
Qty: 90 TABLET | Refills: 2 | OUTPATIENT
Start: 2024-06-06

## 2024-06-06 RX ORDER — VALACYCLOVIR HYDROCHLORIDE 500 MG/1
TABLET, FILM COATED ORAL
Qty: 90 TABLET | Refills: 2 | Status: SHIPPED | OUTPATIENT
Start: 2024-06-06

## 2024-06-14 ENCOUNTER — OFFICE VISIT (OUTPATIENT)
Dept: FAMILY MEDICINE CLINIC | Age: 49
End: 2024-06-14
Payer: COMMERCIAL

## 2024-06-14 VITALS
BODY MASS INDEX: 31.69 KG/M2 | SYSTOLIC BLOOD PRESSURE: 110 MMHG | HEIGHT: 65 IN | WEIGHT: 190.2 LBS | DIASTOLIC BLOOD PRESSURE: 68 MMHG

## 2024-06-14 DIAGNOSIS — E55.9 VITAMIN D DEFICIENCY: ICD-10-CM

## 2024-06-14 DIAGNOSIS — Z11.3 SCREEN FOR STD (SEXUALLY TRANSMITTED DISEASE): Primary | ICD-10-CM

## 2024-06-14 DIAGNOSIS — E66.09 CLASS 1 OBESITY DUE TO EXCESS CALORIES WITH SERIOUS COMORBIDITY AND BODY MASS INDEX (BMI) OF 31.0 TO 31.9 IN ADULT: ICD-10-CM

## 2024-06-14 DIAGNOSIS — Z11.3 SCREEN FOR STD (SEXUALLY TRANSMITTED DISEASE): ICD-10-CM

## 2024-06-14 PROCEDURE — 1036F TOBACCO NON-USER: CPT | Performed by: FAMILY MEDICINE

## 2024-06-14 PROCEDURE — G8417 CALC BMI ABV UP PARAM F/U: HCPCS | Performed by: FAMILY MEDICINE

## 2024-06-14 PROCEDURE — G8427 DOCREV CUR MEDS BY ELIG CLIN: HCPCS | Performed by: FAMILY MEDICINE

## 2024-06-14 PROCEDURE — 99214 OFFICE O/P EST MOD 30 MIN: CPT | Performed by: FAMILY MEDICINE

## 2024-06-14 PROCEDURE — 3017F COLORECTAL CA SCREEN DOC REV: CPT | Performed by: FAMILY MEDICINE

## 2024-06-14 NOTE — PROGRESS NOTES
No chief complaint on file.        HPI: Evon Hogue 49 y.o. female presenting for       Obesity   Patient is here for adipex   Patient reports that she moved due to issues with her tenant   Patient reports taht she is back at e office and reports that none her clothes fit   Patient reports she will get  back to the gym and will make time to work out     F/u   Lost 2 pounds since   Was trying to recover from covid.   Cardiology wanted her to hold of on adipex given the onset of her cardiac symptoms   Would like to try GLP-1 meds    F/u   Patient is currently 11.2 pounds since starting the tirzepatide.    Patient reports that it helps with appetite suppression and is able to sleep.   Admits to increase water intake.    Has been walking more which helps. She plans on adding strength resistance.     F/u   Patient is currently on 5 mg once a week which is helping   Patinet is taking the tirzepatide. Lost 10 pounds over the last   Has lost inches       Heart palpitations   Chronic issue   Did see cardiology in the past   Had a work up done - surgery was not recommended because she was in good health   Patient reports more recently she had more symptoms.     F/u   Patient did see cardiology   Had a holter monitor with some flares noted on the monitor   Patient reports the frequency is not has much when she is on the mounjaro       Current Outpatient Medications   Medication Sig Dispense Refill    valACYclovir (VALTREX) 500 MG tablet TAKE 1 TABLET BY MOUTH DAILY 90 tablet 2    mometasone (ELOCON) 0.1 % cream APPLY TOPICALLY TO AFFECTED AREA(S) ONCE DAILY 15 g 0    Tirzepatide (MOUNJARO) 2.5 MG/0.5ML SOPN SC injection Inject 0.5 mLs into the skin once a week      SUMAtriptan (IMITREX) 50 MG tablet Take 1 tablet by mouth daily as needed for Migraine 9 tablet 5    EPINEPHrine (EPIPEN) 0.3 MG/0.3ML SOAJ injection Use as directed for allergic reaction 2 each 2    albuterol sulfate  (90 Base) MCG/ACT inhaler

## 2024-06-15 LAB
HIV AG/AB: NONREACTIVE
VITAMIN D 25-HYDROXY: 32.6 NG/ML (ref 30–100)

## 2024-06-16 LAB
SPECIMEN SOURCE: NORMAL
T VAGINALIS RRNA SPEC QL NAA+PROBE: NEGATIVE

## 2024-06-17 LAB — VDRL SER QL: NON REACTIVE

## 2024-06-19 LAB
C TRACH DNA UR QL NAA+PROBE: NEGATIVE
N GONORRHOEA DNA UR QL NAA+PROBE: NEGATIVE

## 2024-07-02 LAB
REAGIN AB TITR SER: NORMAL {TITER}
VDRL SER QL: NON REACTIVE

## 2024-09-13 ENCOUNTER — OFFICE VISIT (OUTPATIENT)
Dept: FAMILY MEDICINE CLINIC | Age: 49
End: 2024-09-13

## 2024-09-13 VITALS
DIASTOLIC BLOOD PRESSURE: 82 MMHG | HEART RATE: 90 BPM | BODY MASS INDEX: 28.16 KG/M2 | HEIGHT: 65 IN | SYSTOLIC BLOOD PRESSURE: 112 MMHG | TEMPERATURE: 97.8 F | WEIGHT: 169 LBS | OXYGEN SATURATION: 98 %

## 2024-09-13 DIAGNOSIS — E66.3 OVERWEIGHT (BMI 25.0-29.9): ICD-10-CM

## 2024-09-13 DIAGNOSIS — B07.0 PLANTAR WARTS: ICD-10-CM

## 2024-09-13 DIAGNOSIS — N92.6 IRREGULAR PERIODS: Primary | ICD-10-CM

## 2024-09-13 DIAGNOSIS — N92.6 IRREGULAR PERIODS: ICD-10-CM

## 2024-09-13 DIAGNOSIS — Z13.220 SCREENING FOR HYPERLIPIDEMIA: ICD-10-CM

## 2024-09-13 DIAGNOSIS — E55.9 VITAMIN D DEFICIENCY: ICD-10-CM

## 2024-09-13 LAB
CHOLEST SERPL-MCNC: 183 MG/DL (ref 0–199)
HDLC SERPL-MCNC: 62 MG/DL (ref 40–59)
LDLC SERPL CALC-MCNC: 110 MG/DL (ref 0–129)
PROLACTIN SERPL-MCNC: 8.9 NG/ML
TRIGL SERPL-MCNC: 57 MG/DL (ref 0–150)
TSH REFLEX: 2.29 UIU/ML (ref 0.44–3.86)

## 2024-09-14 LAB
FOLLICLE STIMULATING HORMONE: 67.9 MIU/ML
LH: 44.4 MIU/ML (ref 1.7–8.6)

## 2024-09-17 ENCOUNTER — HOSPITAL ENCOUNTER (OUTPATIENT)
Dept: ULTRASOUND IMAGING | Age: 49
Discharge: HOME | End: 2024-09-19
Attending: FAMILY MEDICINE
Payer: COMMERCIAL

## 2024-09-17 DIAGNOSIS — N92.6 IRREGULAR PERIODS: ICD-10-CM

## 2024-09-17 LAB
ESTRADIOL SERPL HS-MCNC: 6.5 PG/ML
ESTROGEN SERPL CALC-MCNC: 23.1 PG/ML
ESTRONE SERPL-MCNC: 16.6 PG/ML

## 2024-09-17 PROCEDURE — 76830 TRANSVAGINAL US NON-OB: CPT

## 2024-09-17 PROCEDURE — 76856 US EXAM PELVIC COMPLETE: CPT

## 2024-09-18 PROBLEM — T50.Z95A ADVERSE EFFECT OF VACCINE: Status: ACTIVE | Noted: 2024-09-18

## 2024-09-18 PROBLEM — R53.83 FATIGUE: Status: ACTIVE | Noted: 2024-09-18

## 2024-09-20 ENCOUNTER — OFFICE VISIT (OUTPATIENT)
Dept: FAMILY MEDICINE CLINIC | Age: 49
End: 2024-09-20
Payer: COMMERCIAL

## 2024-09-20 VITALS
SYSTOLIC BLOOD PRESSURE: 102 MMHG | HEART RATE: 87 BPM | RESPIRATION RATE: 16 BRPM | BODY MASS INDEX: 29.95 KG/M2 | TEMPERATURE: 98.1 F | WEIGHT: 169 LBS | OXYGEN SATURATION: 98 % | DIASTOLIC BLOOD PRESSURE: 72 MMHG | HEIGHT: 63 IN

## 2024-09-20 DIAGNOSIS — N39.0 ACUTE UTI (URINARY TRACT INFECTION): Primary | ICD-10-CM

## 2024-09-20 DIAGNOSIS — R30.0 BURNING WITH URINATION: ICD-10-CM

## 2024-09-20 LAB
BACTERIA URNS QL MICRO: ABNORMAL /HPF
BILIRUB UR QL STRIP: NEGATIVE
BILIRUBIN, POC: ABNORMAL
BLOOD URINE, POC: 200
CLARITY UR: ABNORMAL
CLARITY, POC: ABNORMAL
COLOR UR: YELLOW
COLOR, POC: YELLOW
EPI CELLS #/AREA URNS AUTO: ABNORMAL /HPF (ref 0–5)
GLUCOSE UR STRIP-MCNC: NEGATIVE MG/DL
GLUCOSE URINE, POC: ABNORMAL MG/DL
HGB UR QL STRIP: ABNORMAL
HYALINE CASTS #/AREA URNS AUTO: ABNORMAL /HPF (ref 0–5)
KETONES UR STRIP-MCNC: ABNORMAL MG/DL
KETONES, POC: ABNORMAL MG/DL
LEUKOCYTE EST, POC: 125
LEUKOCYTE ESTERASE UR QL STRIP: ABNORMAL
NITRITE UR QL STRIP: NEGATIVE
NITRITE, POC: ABNORMAL
PH UR STRIP: >=9 [PH] (ref 5–9)
PH, POC: 7.5
PROT UR STRIP-MCNC: >=300 MG/DL
PROTEIN, POC: 1 MG/DL
RBC #/AREA URNS AUTO: ABNORMAL /HPF (ref 0–5)
SP GR UR STRIP: 1.03 (ref 1–1.03)
SPECIFIC GRAVITY, POC: 1.01
URINE REFLEX TO CULTURE: ABNORMAL
UROBILINOGEN UR STRIP-ACNC: 0.2 E.U./DL
UROBILINOGEN, POC: 3.5 MG/DL
WBC #/AREA URNS AUTO: ABNORMAL /HPF (ref 0–5)

## 2024-09-20 PROCEDURE — 3017F COLORECTAL CA SCREEN DOC REV: CPT | Performed by: PHYSICIAN ASSISTANT

## 2024-09-20 PROCEDURE — G8427 DOCREV CUR MEDS BY ELIG CLIN: HCPCS | Performed by: PHYSICIAN ASSISTANT

## 2024-09-20 PROCEDURE — 99213 OFFICE O/P EST LOW 20 MIN: CPT | Performed by: PHYSICIAN ASSISTANT

## 2024-09-20 PROCEDURE — 81003 URINALYSIS AUTO W/O SCOPE: CPT | Performed by: PHYSICIAN ASSISTANT

## 2024-09-20 PROCEDURE — G8417 CALC BMI ABV UP PARAM F/U: HCPCS | Performed by: PHYSICIAN ASSISTANT

## 2024-09-20 PROCEDURE — 1036F TOBACCO NON-USER: CPT | Performed by: PHYSICIAN ASSISTANT

## 2024-09-20 RX ORDER — NITROFURANTOIN 25; 75 MG/1; MG/1
100 CAPSULE ORAL 2 TIMES DAILY
Qty: 14 CAPSULE | Refills: 0 | Status: SHIPPED | OUTPATIENT
Start: 2024-09-20 | End: 2024-09-27

## 2024-09-20 RX ORDER — PHENAZOPYRIDINE HYDROCHLORIDE 200 MG/1
200 TABLET, FILM COATED ORAL 3 TIMES DAILY PRN
Qty: 6 TABLET | Refills: 0 | Status: SHIPPED | OUTPATIENT
Start: 2024-09-20 | End: 2024-09-22

## 2024-09-20 RX ORDER — FLUCONAZOLE 150 MG/1
150 TABLET ORAL ONCE
Qty: 2 TABLET | Refills: 0 | Status: SHIPPED | OUTPATIENT
Start: 2024-09-20 | End: 2024-09-20

## 2024-09-20 ASSESSMENT — ENCOUNTER SYMPTOMS
GASTROINTESTINAL NEGATIVE: 1
EYES NEGATIVE: 1
DIARRHEA: 0
RESPIRATORY NEGATIVE: 1
NAUSEA: 0
VOMITING: 0

## 2024-09-23 LAB
SPECIMEN SOURCE: NORMAL
T VAGINALIS RRNA SPEC QL NAA+PROBE: NEGATIVE

## 2024-09-24 ENCOUNTER — OFFICE VISIT (OUTPATIENT)
Dept: NEUROLOGY | Age: 49
End: 2024-09-24
Payer: COMMERCIAL

## 2024-09-24 VITALS — DIASTOLIC BLOOD PRESSURE: 82 MMHG | WEIGHT: 167 LBS | BODY MASS INDEX: 29.58 KG/M2 | SYSTOLIC BLOOD PRESSURE: 130 MMHG

## 2024-09-24 DIAGNOSIS — G43.109 MIGRAINE WITH AURA AND WITHOUT STATUS MIGRAINOSUS, NOT INTRACTABLE: ICD-10-CM

## 2024-09-24 DIAGNOSIS — R42 DIZZY SPELLS: ICD-10-CM

## 2024-09-24 DIAGNOSIS — R53.83 FATIGUE, UNSPECIFIED TYPE: ICD-10-CM

## 2024-09-24 DIAGNOSIS — U09.9 POST-COVID SYNDROME: ICD-10-CM

## 2024-09-24 DIAGNOSIS — R42 DIZZINESS: Primary | ICD-10-CM

## 2024-09-24 PROCEDURE — G8417 CALC BMI ABV UP PARAM F/U: HCPCS | Performed by: PSYCHIATRY & NEUROLOGY

## 2024-09-24 PROCEDURE — 3017F COLORECTAL CA SCREEN DOC REV: CPT | Performed by: PSYCHIATRY & NEUROLOGY

## 2024-09-24 PROCEDURE — G8427 DOCREV CUR MEDS BY ELIG CLIN: HCPCS | Performed by: PSYCHIATRY & NEUROLOGY

## 2024-09-24 PROCEDURE — 99204 OFFICE O/P NEW MOD 45 MIN: CPT | Performed by: PSYCHIATRY & NEUROLOGY

## 2024-09-24 PROCEDURE — 1036F TOBACCO NON-USER: CPT | Performed by: PSYCHIATRY & NEUROLOGY

## 2024-09-24 RX ORDER — MECLIZINE HYDROCHLORIDE 25 MG/1
25 TABLET ORAL 3 TIMES DAILY PRN
Qty: 60 TABLET | Refills: 0 | Status: SHIPPED | OUTPATIENT
Start: 2024-09-24 | End: 2024-10-24

## 2024-09-24 NOTE — PROGRESS NOTES
02/15/2024 10:43 AM     02/15/2024 10:43 AM    MPV 11.0 11/25/2014 04:11 PM     Lab Results   Component Value Date/Time     02/15/2024 10:43 AM    K 5.6 02/15/2024 10:43 AM     02/15/2024 10:43 AM    CO2 25 02/15/2024 10:43 AM    BUN 27 02/15/2024 10:43 AM    CREATININE 0.82 02/15/2024 10:43 AM    GFRAA >60.0 10/27/2017 09:51 AM    LABGLOM >60.0 02/15/2024 10:43 AM    GLUCOSE 92 02/15/2024 10:43 AM    CALCIUM 9.7 02/15/2024 10:43 AM    BILITOT 0.3 10/27/2017 09:51 AM    ALKPHOS 48 10/27/2017 09:51 AM    AST 19 10/27/2017 09:51 AM    ALT 17 10/27/2017 09:51 AM     No results found for: \"PROTIME\", \"INR\"  Lab Results   Component Value Date/Time    TSH 2.290 09/13/2024 09:33 AM    PTXSEUBW02 835 12/19/2017 10:10 AM    FERRITIN 12 10/18/2022 04:48 PM    IRON 23 10/18/2022 04:48 PM    TIBC 357 10/18/2022 04:48 PM     Lab Results   Component Value Date/Time    TRIG 57 09/13/2024 09:33 AM    HDL 62 09/13/2024 09:33 AM    HDL 38 08/07/2011 12:00 AM     No results found for: \"LABAMPH\", \"BARBSCNU\", \"LABBENZ\", \"CANNAB\", \"LABMETH\", \"PPXUR\", \"ETOH\"  No results found for: \"LITHIUM\", \"DILFRTOT\", \"VALPROATE\"    Assessment:       Diagnosis Orders   1. Migraine with aura and without status migrainosus, not intractable        2. Dizziness  MRI BRAIN WO CONTRAST      3. Fatigue, unspecified type        4. Post-COVID syndrome        Post COVID syndrome with dizziness.  This likely appears to be vestibular.  Recommended that we actually obtain MRI of the brain to make sure there is no cerebellar component to this.  I have started on Antivert chronically twice a day to see if this helps.  Patient is not orthostatic.  Her supine blood pressure is 122/70 with a pulse of 69 with a standing blood pressure 132/82 with a pulse of 90.  This is not dysautonomia either.    Since this dizziness occurs without any aggravation and underlying temporal lobe epilepsy must be ruled out and therefore an EEG    Patient has migraine

## 2024-09-25 LAB
C TRACH DNA UR QL NAA+PROBE: NEGATIVE
N GONORRHOEA DNA UR QL NAA+PROBE: NEGATIVE

## 2024-10-02 ENCOUNTER — TELEPHONE (OUTPATIENT)
Dept: FAMILY MEDICINE CLINIC | Age: 49
End: 2024-10-02

## 2024-10-02 NOTE — TELEPHONE ENCOUNTER
Patient has appointment tomorrow (10/3) but does not feel she needs.   Patient however has question on Mounjaro  Does she need to come in for this to be refilled?   She is scheduled for every 3 months  Please refill if possible and let patient know so she can cancel tomorrows appt        LOV 9/13/24  Next office visit 12/12/24    Patient phone [phone]

## 2024-10-02 NOTE — TELEPHONE ENCOUNTER
Patient wants to cancel tomorrows appointment as it's not needed. She does need moounjaro rx with refills faxed to [facility] since she did not receive refills & she needs enough to get her over until her 3 month f/up on 12/12/24

## 2024-10-16 ENCOUNTER — HOSPITAL ENCOUNTER (OUTPATIENT)
Dept: NEUROLOGY | Age: 49
Discharge: HOME | End: 2024-10-16
Attending: PSYCHIATRY & NEUROLOGY
Payer: COMMERCIAL

## 2024-10-16 ENCOUNTER — HOSPITAL ENCOUNTER (OUTPATIENT)
Dept: MRI IMAGING | Age: 49
Discharge: HOME | End: 2024-10-18
Attending: PSYCHIATRY & NEUROLOGY
Payer: COMMERCIAL

## 2024-10-16 DIAGNOSIS — R42 DIZZINESS: ICD-10-CM

## 2024-10-16 DIAGNOSIS — R42 DIZZY SPELLS: ICD-10-CM

## 2024-10-16 PROCEDURE — 70551 MRI BRAIN STEM W/O DYE: CPT

## 2024-10-16 PROCEDURE — 95816 EEG AWAKE AND DROWSY: CPT

## 2024-10-28 ENCOUNTER — TELEPHONE (OUTPATIENT)
Dept: NEUROLOGY | Age: 49
End: 2024-10-28

## 2024-10-28 NOTE — TELEPHONE ENCOUNTER
NewAer sent from pt. MRI and EEG were done on 10/16/24. Please advise.      I’m following up to see you’ve had an opportunity to read my EEG and MRI test results? I saw on [facility] the MRI results are posted, but I don’t see anything about the EEG. I’m concerned because I experienced an episode during the EEG and still haven’t received any feedback and these tests were run 1 1/2 weeks ago.      Please advise.      Regards,     Evon Hogue  [phone]

## 2024-12-05 ENCOUNTER — OFFICE VISIT (OUTPATIENT)
Dept: NEUROLOGY | Age: 49
End: 2024-12-05
Payer: COMMERCIAL

## 2024-12-05 VITALS
DIASTOLIC BLOOD PRESSURE: 78 MMHG | WEIGHT: 155 LBS | HEART RATE: 88 BPM | SYSTOLIC BLOOD PRESSURE: 112 MMHG | BODY MASS INDEX: 27.46 KG/M2

## 2024-12-05 DIAGNOSIS — U09.9 POST-COVID SYNDROME: Primary | ICD-10-CM

## 2024-12-05 DIAGNOSIS — G43.109 MIGRAINE WITH AURA AND WITHOUT STATUS MIGRAINOSUS, NOT INTRACTABLE: ICD-10-CM

## 2024-12-05 DIAGNOSIS — R53.83 FATIGUE, UNSPECIFIED TYPE: ICD-10-CM

## 2024-12-05 DIAGNOSIS — R42 DIZZINESS: ICD-10-CM

## 2024-12-05 PROCEDURE — 3017F COLORECTAL CA SCREEN DOC REV: CPT | Performed by: PSYCHIATRY & NEUROLOGY

## 2024-12-05 PROCEDURE — 99214 OFFICE O/P EST MOD 30 MIN: CPT | Performed by: PSYCHIATRY & NEUROLOGY

## 2024-12-05 PROCEDURE — G8427 DOCREV CUR MEDS BY ELIG CLIN: HCPCS | Performed by: PSYCHIATRY & NEUROLOGY

## 2024-12-05 PROCEDURE — 1036F TOBACCO NON-USER: CPT | Performed by: PSYCHIATRY & NEUROLOGY

## 2024-12-05 PROCEDURE — G8417 CALC BMI ABV UP PARAM F/U: HCPCS | Performed by: PSYCHIATRY & NEUROLOGY

## 2024-12-05 PROCEDURE — G8484 FLU IMMUNIZE NO ADMIN: HCPCS | Performed by: PSYCHIATRY & NEUROLOGY

## 2024-12-05 RX ORDER — HYDROXYZINE HYDROCHLORIDE 50 MG/1
TABLET, FILM COATED ORAL
Qty: 60 TABLET | Refills: 1 | Status: SHIPPED | OUTPATIENT
Start: 2024-12-05

## 2024-12-05 NOTE — PROGRESS NOTES
Number of Visits Requested:   1     Orders Placed This Encounter   Medications    hydrOXYzine HCl (ATARAX) 50 MG tablet     Si twice a day     Dispense:  60 tablet     Refill:  1       No follow-ups on file.      Luis Stallings MD

## 2024-12-12 ENCOUNTER — OFFICE VISIT (OUTPATIENT)
Dept: FAMILY MEDICINE CLINIC | Age: 49
End: 2024-12-12

## 2024-12-12 VITALS
DIASTOLIC BLOOD PRESSURE: 78 MMHG | HEIGHT: 63 IN | HEART RATE: 71 BPM | BODY MASS INDEX: 27.11 KG/M2 | WEIGHT: 153 LBS | SYSTOLIC BLOOD PRESSURE: 108 MMHG | OXYGEN SATURATION: 93 % | TEMPERATURE: 97 F

## 2024-12-12 DIAGNOSIS — B00.9 HSV (HERPES SIMPLEX VIRUS) INFECTION: ICD-10-CM

## 2024-12-12 DIAGNOSIS — B07.0 PLANTAR WART OF RIGHT FOOT: ICD-10-CM

## 2024-12-12 DIAGNOSIS — J03.91 RECURRENT TONSILLITIS: ICD-10-CM

## 2024-12-12 DIAGNOSIS — T36.95XA ANTIBIOTIC-INDUCED YEAST INFECTION: Primary | ICD-10-CM

## 2024-12-12 DIAGNOSIS — B37.9 ANTIBIOTIC-INDUCED YEAST INFECTION: Primary | ICD-10-CM

## 2024-12-12 RX ORDER — FLUCONAZOLE 150 MG/1
150 TABLET ORAL
Qty: 2 TABLET | Refills: 0 | Status: SHIPPED | OUTPATIENT
Start: 2024-12-12 | End: 2024-12-18

## 2024-12-12 RX ORDER — VALACYCLOVIR HYDROCHLORIDE 500 MG/1
TABLET, FILM COATED ORAL
Qty: 90 TABLET | Refills: 2 | Status: SHIPPED | OUTPATIENT
Start: 2024-12-12

## 2024-12-12 RX ORDER — UREA 40 %
CREAM (GRAM) TOPICAL
Qty: 85 G | Refills: 0 | Status: SHIPPED | OUTPATIENT
Start: 2024-12-12

## 2024-12-12 NOTE — PROGRESS NOTES
the patient use OTC acetaminophen as needed for pain.   4. Return in 3 weeks.        A/P: Evon M Mykel 49 y.o. female presenting for      1. HSV (herpes simplex virus) infection  Stableat  this time   - valACYclovir (VALTREX) 500 MG tablet; TAKE 1 TABLET BY MOUTH DAILY  Dispense: 90 tablet; Refill: 2    2. Antibiotic-induced yeast infection  Was treated for a UTI. Now has yeast infection. Will send [facility] to pharmacy   - fluconazole (DIFLUCAN) 150 MG tablet; Take 1 tablet by mouth every 72 hours for 6 days  Dispense: 2 tablet; Refill: 0    3. Plantar wart of right foot  Removed in the clinic   Tolerated procedure     4. Recurrent tonsillitis  Patient will see ENT.     Overweight   Will start the weaning process of the tirzepetide.   Will wean down to 2.5 mg at next visit.                     Please note, this report has been partially produced using speech recognition software  and may cause  and /or contain errors related to that system including grammar, punctuation and spelling as well as words and phrases that may seem inappropriate. If there are questions or concerns please feel free to contact me to clarify.

## 2024-12-27 ENCOUNTER — OFFICE VISIT (OUTPATIENT)
Age: 49
End: 2024-12-27

## 2024-12-27 VITALS
HEIGHT: 63 IN | TEMPERATURE: 97.4 F | OXYGEN SATURATION: 98 % | WEIGHT: 153 LBS | HEART RATE: 84 BPM | BODY MASS INDEX: 27.11 KG/M2

## 2024-12-27 DIAGNOSIS — J35.8 TONSIL STONE: ICD-10-CM

## 2024-12-27 DIAGNOSIS — R43.8 HYPOSMIA: ICD-10-CM

## 2024-12-27 DIAGNOSIS — J34.89 NASAL OBSTRUCTION: ICD-10-CM

## 2024-12-27 DIAGNOSIS — R42 DIZZINESS: Primary | ICD-10-CM

## 2024-12-27 NOTE — PROGRESS NOTES
[facility] [place] [facility], [facility] OTOLARYNGOLOGY  [address] [place] [address], [address]  [place] [id number]  Dept: [phone]  Dept Fax: [phone]  Loc: [phone]     12/27/2024    Visit type: New patient    Reason for Visit: Dizziness       ASSESSMENT/PLAN   1. Dizziness  -     External Referral to Audiology  2. Hyposmia  -     Allergen, Respiratory, Region 5 Panel; Future  3. Nasal obstruction  -     Allergen, Respiratory, Region 5 Panel; Future  4. Tonsil stone    Assessment & Plan  1. Post-COVID-19 dizziness.  The etiology of her dizziness could be multifactorial as is a common post COVID/long COVID symptom. Her symptoms do not align with an ear infection or BPPV. Given her history of chronic migraines, it is plausible that her dizziness could be a manifestation of a vestibular migraine. A referral will be made for specialized vestibular testing with Audiology to further investigate the cause of her dizziness. She will commence a regimen of magnesium sulfate or oxide 400 mg twice daily and vitamin B2 200 mg to manage her migraines and advance with further medication intervention as needed. She will also be following up with neurology in the near future.     2. nasal drainage and obstruction.  Her condition appears to be well-managed with Flonase. The thickening observed in one of her sinuses on the MRI is nonspecific and does not account for all her symptoms. She will continue her Flonase treatment. Allergy testing ir ordered. She will also be provided with information on smell retraining therapy. If she continues to experience nasal issues, nasal endoscopy can be conducted.    3. Tonsil stones.  She is advised to start hot salt water gargles and/or Waterpik to remove the stones. The sinus rinse may also provide some relief. If the stones become increasingly bothersome, a tonsillectomy may be considered.        Subjective    Patient: Evon Hogue is a 49 y.o. female

## 2024-12-30 LAB
A ALTERNATA IGE QN: <0.1 KU/L (ref 0–0.34)
A FUMIGATUS IGE QN: <0.1 KU/L (ref 0–0.34)
AMER SYCAMORE IGE QN: <0.1 KU/L (ref 0–0.34)
BERMUDA GRASS IGE QN: <0.1 KU/L (ref 0–0.34)
BOXELDER IGE QN: <0.1 KU/L (ref 0–0.34)
C SPHAEROSPERMUM IGE QN: <0.1 KUL/L (ref 0–0.34)
CALIF WALNUT IGE QN: <0.1 KU/L (ref 0–0.34)
CAT DANDER IGE QN: 0.28 KU/L (ref 0–0.34)
CMN PIGWEED IGE QN: <0.1 KU/L (ref 0–0.34)
COMMON RAGWEED IGE QN: <0.1 KU/L (ref 0–0.34)
COTTONWOOD IGE QN: <0.1 KU/L (ref 0–0.34)
D FARINAE IGE QN: 1.03 KU/L (ref 0–0.34)
D PTERONYSS IGE QN: 0.69 KU/L (ref 0–0.34)
DOG DANDER IGE QN: <0.1 KU/L (ref 0–0.34)
IGE SERPL-ACNC: 24 IU/ML (ref 0–100)
M RACEMOSUS IGE QN: <0.1 KU/L (ref 0–0.34)
MOUSE EPITH IGE QN: <0.1 KU/L (ref 0–0.34)
P NOTATUM IGE QN: <0.1 KU/L (ref 0–0.34)
PECAN/HICK TREE IGE QN: <0.1 KU/L (ref 0–0.34)
RED CEDAR IGE QN: <0.1 KU/L (ref 0–0.34)
ROACH IGE QN: 0.51 KU/L (ref 0–0.34)
SALTWORT IGE QN: <0.1 KU/L (ref 0–0.34)
SHEEP SORREL IGE QN: <0.1 KU/L (ref 0–0.34)
SILVER BIRCH IGE QN: <0.1 KU/L (ref 0–0.34)
TIMOTHY IGE QN: <0.1 KU/L (ref 0–0.34)
WHITE ASH IGE QN: <0.1 KU/L (ref 0–0.34)
WHITE ELM IGE QN: <0.1 KU/L (ref 0–0.34)
WHITE MULBERRY IGE QN: <0.1 KU/L (ref 0–0.34)
WHITE OAK IGE QN: <0.1 KU/L (ref 0–0.34)

## 2025-01-07 DIAGNOSIS — R42 DIZZINESS: Primary | ICD-10-CM

## 2025-02-27 ENCOUNTER — TELEPHONE (OUTPATIENT)
Age: 50
End: 2025-02-27

## 2025-02-27 NOTE — TELEPHONE ENCOUNTER
Had vestibular testing done at [facility], was told nothing was abnormal and to f/u with Dr. Armendariz. Still experiencing dizziness, had to reschedule appt to a later date     Wondering what next steps are, any options   Next appt is 3/21/25     PLEASE ADVISE

## 2025-03-03 NOTE — TELEPHONE ENCOUNTER
Per Dr. Armendariz  Nasal endoscopy will be offered at follow up. Additional medication and testing based on her symptoms.

## 2025-03-13 ENCOUNTER — OFFICE VISIT (OUTPATIENT)
Age: 50
End: 2025-03-13

## 2025-03-13 VITALS
SYSTOLIC BLOOD PRESSURE: 118 MMHG | DIASTOLIC BLOOD PRESSURE: 80 MMHG | BODY MASS INDEX: 25.87 KG/M2 | HEIGHT: 63 IN | OXYGEN SATURATION: 98 % | WEIGHT: 146 LBS | TEMPERATURE: 97.8 F | HEART RATE: 97 BPM

## 2025-03-13 DIAGNOSIS — E66.3 OVERWEIGHT (BMI 25.0-29.9): ICD-10-CM

## 2025-03-13 DIAGNOSIS — Z23 NEED FOR SHINGLES VACCINE: ICD-10-CM

## 2025-03-13 DIAGNOSIS — Z12.31 ENCOUNTER FOR SCREENING MAMMOGRAM FOR MALIGNANT NEOPLASM OF BREAST: ICD-10-CM

## 2025-03-13 DIAGNOSIS — B07.0 PLANTAR WART: Primary | ICD-10-CM

## 2025-03-13 DIAGNOSIS — J35.8 TONSIL STONE: ICD-10-CM

## 2025-03-13 DIAGNOSIS — B00.9 HSV (HERPES SIMPLEX VIRUS) INFECTION: ICD-10-CM

## 2025-03-13 DIAGNOSIS — J30.9 ALLERGIC RHINITIS, UNSPECIFIED SEASONALITY, UNSPECIFIED TRIGGER: ICD-10-CM

## 2025-03-13 RX ORDER — ZOSTER VACCINE RECOMBINANT, ADJUVANTED 50 MCG/0.5
0.5 KIT INTRAMUSCULAR SEE ADMIN INSTRUCTIONS
Qty: 0.5 ML | Refills: 0 | Status: SHIPPED | OUTPATIENT
Start: 2025-03-13 | End: 2025-09-09

## 2025-03-13 RX ORDER — TRIAMCINOLONE ACETONIDE 40 MG/ML
60 INJECTION, SUSPENSION INTRA-ARTICULAR; INTRAMUSCULAR ONCE
Status: COMPLETED | OUTPATIENT
Start: 2025-03-13 | End: 2025-03-13

## 2025-03-13 RX ORDER — VALACYCLOVIR HYDROCHLORIDE 500 MG/1
TABLET, FILM COATED ORAL
Qty: 90 TABLET | Refills: 2 | Status: SHIPPED | OUTPATIENT
Start: 2025-03-13

## 2025-03-13 RX ORDER — ZOSTER VACCINE RECOMBINANT, ADJUVANTED 50 MCG/0.5
0.5 KIT INTRAMUSCULAR SEE ADMIN INSTRUCTIONS
Qty: 0.5 ML | Refills: 0 | Status: SHIPPED | OUTPATIENT
Start: 2025-03-13 | End: 2025-03-13

## 2025-03-13 RX ADMIN — TRIAMCINOLONE ACETONIDE 60 MG: 40 INJECTION, SUSPENSION INTRA-ARTICULAR; INTRAMUSCULAR at 09:14

## 2025-03-13 SDOH — ECONOMIC STABILITY: FOOD INSECURITY: WITHIN THE PAST 12 MONTHS, YOU WORRIED THAT YOUR FOOD WOULD RUN OUT BEFORE YOU GOT MONEY TO BUY MORE.: NEVER TRUE

## 2025-03-13 SDOH — ECONOMIC STABILITY: INCOME INSECURITY: IN THE LAST 12 MONTHS, WAS THERE A TIME WHEN YOU WERE NOT ABLE TO PAY THE MORTGAGE OR RENT ON TIME?: NO

## 2025-03-13 SDOH — ECONOMIC STABILITY: FOOD INSECURITY: WITHIN THE PAST 12 MONTHS, THE FOOD YOU BOUGHT JUST DIDN'T LAST AND YOU DIDN'T HAVE MONEY TO GET MORE.: NEVER TRUE

## 2025-03-13 ASSESSMENT — PATIENT HEALTH QUESTIONNAIRE - PHQ9
SUM OF ALL RESPONSES TO PHQ QUESTIONS 1-9: 0
1. LITTLE INTEREST OR PLEASURE IN DOING THINGS: NOT AT ALL
SUM OF ALL RESPONSES TO PHQ9 QUESTIONS 1 & 2: 0
2. FEELING DOWN, DEPRESSED OR HOPELESS: NOT AT ALL
8. MOVING OR SPEAKING SO SLOWLY THAT OTHER PEOPLE COULD HAVE NOTICED. OR THE OPPOSITE, BEING SO FIGETY OR RESTLESS THAT YOU HAVE BEEN MOVING AROUND A LOT MORE THAN USUAL: NOT AT ALL
2. FEELING DOWN, DEPRESSED OR HOPELESS: NOT AT ALL
1. LITTLE INTEREST OR PLEASURE IN DOING THINGS: NOT AT ALL
6. FEELING BAD ABOUT YOURSELF - OR THAT YOU ARE A FAILURE OR HAVE LET YOURSELF OR YOUR FAMILY DOWN: NOT AT ALL
1. LITTLE INTEREST OR PLEASURE IN DOING THINGS: NOT AT ALL
SUM OF ALL RESPONSES TO PHQ QUESTIONS 1-9: 0
SUM OF ALL RESPONSES TO PHQ QUESTIONS 1-9: 0
2. FEELING DOWN, DEPRESSED OR HOPELESS: NOT AT ALL
5. POOR APPETITE OR OVEREATING: NOT AT ALL
4. FEELING TIRED OR HAVING LITTLE ENERGY: NOT AT ALL
SUM OF ALL RESPONSES TO PHQ QUESTIONS 1-9: 0
7. TROUBLE CONCENTRATING ON THINGS, SUCH AS READING THE NEWSPAPER OR WATCHING TELEVISION: NOT AT ALL
9. THOUGHTS THAT YOU WOULD BE BETTER OFF DEAD, OR OF HURTING YOURSELF: NOT AT ALL
SUM OF ALL RESPONSES TO PHQ QUESTIONS 1-9: 0
3. TROUBLE FALLING OR STAYING ASLEEP: NOT AT ALL
SUM OF ALL RESPONSES TO PHQ QUESTIONS 1-9: 0
10. IF YOU CHECKED OFF ANY PROBLEMS, HOW DIFFICULT HAVE THESE PROBLEMS MADE IT FOR YOU TO DO YOUR WORK, TAKE CARE OF THINGS AT HOME, OR GET ALONG WITH OTHER PEOPLE: NOT DIFFICULT AT ALL

## 2025-03-13 ASSESSMENT — COLUMBIA-SUICIDE SEVERITY RATING SCALE - C-SSRS
6. HAVE YOU EVER DONE ANYTHING, STARTED TO DO ANYTHING, OR PREPARED TO DO ANYTHING TO END YOUR LIFE?: NO
2. HAVE YOU ACTUALLY HAD ANY THOUGHTS OF KILLING YOURSELF?: NO
1. WITHIN THE PAST MONTH, HAVE YOU WISHED YOU WERE DEAD OR WISHED YOU COULD GO TO SLEEP AND NOT WAKE UP?: NO

## 2025-03-13 NOTE — PROGRESS NOTES
Chief Complaint   Patient presents with    3 Month Follow-Up     Patient has a question about the shingles vaccine. Yes to mammogram.     Patient reports she still has bumps on right foot    Health Maintenance     Declined other vaccines        HPI: Evon Hogue 49 y.o. female presenting for    History of Present Illness  The patient presents for evaluation of plantar wart, allergies, anosmia, dizziness, tonsil stones, weight management, and health maintenance.      Allergic rhinitis   She reports severe allergies, which have been exacerbated due to her inability to use her nasal spray while caring for her mother in the hospital. She recently resumed using the nasal spray. She has not been performing nasal saline washes, despite a previous recommendation from an ENT specialist. She is scheduled to see her ENT specialist next Friday.    Anosmia/ageusia  She has been experiencing anosmia and ageusia for several months, which she attributes to a post-COVID-19 infection. She has been undergoing olfactory training with essential oils as part of her therapy, but this has not resulted in any improvement.        She has been managing her tonsil stones with hot salt water gargles and reports that the condition has remained stable.        She expresses interest in receiving the shingles vaccine. She has a history of chickenpox but did not receive the chickenpox vaccine. She declines the tetanus vaccine at this time. She requests a refill of her Valtrex prescription.    FAMILY HISTORY  Her mother has neuroendocrine cancer and recently underwent a liver embolization.    MEDICATIONS  Current: tirzepatide, Valtrex    Itchy feet bilaterally   Itchy, noticed bumps on the left foot.   Denies any drainage or pain in the area.  But does feel uncomfortable when she presses on it. X 3 weeks.   Denies any sweaty feet.   Admits to wearing sneakers more with working up.      F/u   She has been managing a persistent plantar wart on

## 2025-03-14 ENCOUNTER — HOSPITAL ENCOUNTER (OUTPATIENT)
Dept: WOMENS IMAGING | Age: 50
Discharge: HOME | End: 2025-03-16
Attending: FAMILY MEDICINE
Payer: COMMERCIAL

## 2025-03-14 DIAGNOSIS — Z12.31 ENCOUNTER FOR SCREENING MAMMOGRAM FOR MALIGNANT NEOPLASM OF BREAST: ICD-10-CM

## 2025-03-14 PROCEDURE — 77063 BREAST TOMOSYNTHESIS BI: CPT

## 2025-03-17 ENCOUNTER — RESULTS FOLLOW-UP (OUTPATIENT)
Dept: WOMENS IMAGING | Age: 50
End: 2025-03-17

## 2025-03-20 NOTE — PROGRESS NOTES
[facility]YSICIANS [place] [facility], [facility] OTOLARYNGOLOGY  [address] [place] [address], [address]  [place] [id number]  Dept: [phone]  Dept Fax: [phone]  Loc: [phone]     3/21/2025    Visit type: Follow up    Reason for Visit: Follow-up (Has taste and smell back ) and Hearing Loss (Review results )       ASSESSMENT/PLAN   1. Dizziness  -     CT SINUS WO CONTRAST; Future  2. Nasal drainage  -     CT SINUS WO CONTRAST; Future  3. Nasal obstruction  -     CT SINUS WO CONTRAST; Future    No follow-ups on file.  Orders Placed This Encounter   Medications    Azelastine-Fluticasone 137-50 MCG/ACT SUSP     Si spray by Nasal route 2 times daily     Dispense:  1 each     Refill:  3      Improved with nasal steroid. Start Dysmista.   CT sinus to evaluate for chronic sinusitis as cause of dizziness  If negative consider referral to neuro     Subjective    Patient: Evon Hogue is a 50 y.o. female   HPI:    Recall,   24  1. Post-COVID-19 dizziness.  The etiology of her dizziness could be multifactorial as is a common post COVID/long COVID symptom. Her symptoms do not align with an ear infection or BPPV. Given her history of chronic migraines, it is plausible that her dizziness could be a manifestation of a vestibular migraine. A referral will be made for specialized vestibular testing with Audiology to further investigate the cause of her dizziness. She will commence a regimen of magnesium sulfate or oxide 400 mg twice daily and vitamin B2 200 mg to manage her migraines and advance with further medication intervention as needed. She will also be following up with neurology in the near future.      2. nasal drainage and obstruction.  Her condition appears to be well-managed with Flonase. The thickening observed in one of her sinuses on the MRI is nonspecific and does not account for all her symptoms. She will continue her Flonase treatment. Allergy testing ir ordered. She will

## 2025-03-21 ENCOUNTER — OFFICE VISIT (OUTPATIENT)
Age: 50
End: 2025-03-21

## 2025-03-21 VITALS
DIASTOLIC BLOOD PRESSURE: 74 MMHG | HEART RATE: 84 BPM | HEIGHT: 63 IN | OXYGEN SATURATION: 98 % | RESPIRATION RATE: 17 BRPM | WEIGHT: 144 LBS | SYSTOLIC BLOOD PRESSURE: 110 MMHG | TEMPERATURE: 97.4 F | BODY MASS INDEX: 25.52 KG/M2

## 2025-03-21 DIAGNOSIS — J34.89 NASAL OBSTRUCTION: ICD-10-CM

## 2025-03-21 DIAGNOSIS — J34.89 NASAL DRAINAGE: ICD-10-CM

## 2025-03-21 DIAGNOSIS — R42 DIZZINESS: Primary | ICD-10-CM

## 2025-03-21 RX ORDER — AZELASTINE HYDROCHLORIDE, FLUTICASONE PROPIONATE 137; 50 UG/1; UG/1
1 SPRAY, METERED NASAL 2 TIMES DAILY
Qty: 1 EACH | Refills: 3 | Status: SHIPPED | OUTPATIENT
Start: 2025-03-21

## 2025-03-21 NOTE — PROGRESS NOTES
Just recently regained sense of smell this week     Recently was off nasal spray and realized how much it was helping

## 2025-04-02 DIAGNOSIS — R30.0 DYSURIA: Primary | ICD-10-CM

## 2025-04-02 RX ORDER — NITROFURANTOIN 25; 75 MG/1; MG/1
100 CAPSULE ORAL 2 TIMES DAILY
Qty: 10 CAPSULE | Refills: 0 | Status: SHIPPED | OUTPATIENT
Start: 2025-04-02 | End: 2025-04-07

## 2025-04-02 RX ORDER — FLUCONAZOLE 150 MG/1
150 TABLET ORAL
Qty: 2 TABLET | Refills: 0 | Status: SHIPPED | OUTPATIENT
Start: 2025-04-02 | End: 2025-04-08

## 2025-04-10 ENCOUNTER — OFFICE VISIT (OUTPATIENT)
Age: 50
End: 2025-04-10
Payer: COMMERCIAL

## 2025-04-10 VITALS
HEART RATE: 94 BPM | SYSTOLIC BLOOD PRESSURE: 122 MMHG | OXYGEN SATURATION: 97 % | BODY MASS INDEX: 25.37 KG/M2 | WEIGHT: 145 LBS | DIASTOLIC BLOOD PRESSURE: 80 MMHG

## 2025-04-10 DIAGNOSIS — R42 DIZZINESS: Primary | ICD-10-CM

## 2025-04-10 DIAGNOSIS — G90.1 DYSAUTONOMIA (HCC): ICD-10-CM

## 2025-04-10 DIAGNOSIS — H81.13 BENIGN PAROXYSMAL POSITIONAL VERTIGO DUE TO BILATERAL VESTIBULAR DISORDER: ICD-10-CM

## 2025-04-10 DIAGNOSIS — U09.9 POST-COVID SYNDROME: ICD-10-CM

## 2025-04-10 PROCEDURE — 1036F TOBACCO NON-USER: CPT | Performed by: PSYCHIATRY & NEUROLOGY

## 2025-04-10 PROCEDURE — 3017F COLORECTAL CA SCREEN DOC REV: CPT | Performed by: PSYCHIATRY & NEUROLOGY

## 2025-04-10 PROCEDURE — 99214 OFFICE O/P EST MOD 30 MIN: CPT | Performed by: PSYCHIATRY & NEUROLOGY

## 2025-04-10 PROCEDURE — G8427 DOCREV CUR MEDS BY ELIG CLIN: HCPCS | Performed by: PSYCHIATRY & NEUROLOGY

## 2025-04-10 PROCEDURE — G8417 CALC BMI ABV UP PARAM F/U: HCPCS | Performed by: PSYCHIATRY & NEUROLOGY

## 2025-04-10 RX ORDER — SCOPOLAMINE 1 MG/3D
1 PATCH, EXTENDED RELEASE TRANSDERMAL
Qty: 10 PATCH | Refills: 3 | Status: SHIPPED | OUTPATIENT
Start: 2025-04-10

## 2025-04-10 NOTE — PROGRESS NOTES
Subjective:      Patient ID: Evon Hogue is a 50 y.o. female who presents today for:  Chief Complaint   Patient presents with    Follow-up     Pt states she is still having her episodes she fell she got dizzy going down her stairs. Pt still trying to figure out why she is having these episodes        HPI 50 right-handed female with post-COVID syndrome with dizziness with fatigue with mental fog and migraines.  Patient continues on Atarax for dizziness still having episodes of dizziness.   She had a spell while going down.  She just gets woozy.  She is not complain tinnitus or hearing loss.  She has seen ENT as well.  She does not recall Atarax      Past Medical History:   Diagnosis Date    Anemia     Asthma     Migraines     Staph infection     -hx staph colonization     Past Surgical History:   Procedure Laterality Date    ABDOMEN SURGERY      mass removed     SECTION      COLONOSCOPY N/A 2024    COLORECTAL CANCER SCREENING, NOT HIGH RISK performed by Wilma Sheets MD at [facility]     Social History     Socioeconomic History    Marital status:      Spouse name: Not on file    Number of children: Not on file    Years of education: Not on file    Highest education level: Not on file   Occupational History    Not on file   Tobacco Use    Smoking status: Never    Smokeless tobacco: Never   Substance and Sexual Activity    Alcohol use: Yes     Comment: Occasionally. Not daily.    Drug use: No    Sexual activity: Yes     Partners: Male   Other Topics Concern    Not on file   Social History Narrative    Not on file     Social Drivers of Health     Financial Resource Strain: Patient Declined (2024)    Overall Financial Resource Strain (CARDIA)     Difficulty of Paying Living Expenses: Patient declined   Food Insecurity: No Food Insecurity (3/13/2025)    Hunger Vital Sign     Worried About Running Out of Food in the Last Year: Never true     Ran Out of Food in the

## 2025-04-25 ENCOUNTER — HOSPITAL ENCOUNTER (OUTPATIENT)
Dept: NEUROLOGY | Age: 50
Discharge: HOME | End: 2025-04-25
Attending: PSYCHIATRY & NEUROLOGY
Payer: COMMERCIAL

## 2025-04-25 DIAGNOSIS — R42 DIZZINESS: ICD-10-CM

## 2025-04-25 PROCEDURE — 95816 EEG AWAKE AND DROWSY: CPT

## 2025-05-13 ENCOUNTER — TELEPHONE (OUTPATIENT)
Age: 50
End: 2025-05-13

## 2025-05-16 ENCOUNTER — HOSPITAL ENCOUNTER (OUTPATIENT)
Dept: MRI IMAGING | Age: 50
Discharge: HOME | End: 2025-05-18
Attending: PSYCHIATRY & NEUROLOGY
Payer: COMMERCIAL

## 2025-05-16 DIAGNOSIS — R42 DIZZINESS: ICD-10-CM

## 2025-05-16 PROCEDURE — 70544 MR ANGIOGRAPHY HEAD W/O DYE: CPT

## 2025-05-16 PROCEDURE — 70547 MR ANGIOGRAPHY NECK W/O DYE: CPT

## 2025-05-23 ENCOUNTER — TELEPHONE (OUTPATIENT)
Age: 50
End: 2025-05-23

## 2025-05-23 NOTE — TELEPHONE ENCOUNTER
----- Message from Valery ARAGON sent at 5/23/2025  1:38 PM EDT -----  Regarding: [facility] Appointment Request  [facility] Appointment Request    Patient needs appointment for [facility] Appointment Type: Existing Condition Follow Up.1ST attempt 5/13 @ 11:49 am to r/s pt, provider unavail JESSICA.  3 month follow up - weight mgmt       Patient Requested Dates(s): Fridays of June  Patient Requested Time: earliest in the morning  Provider Name: Beth Atkinson MD or Evon Velez, [facility] - [facility]    Reason for Appointment Request: Established Patient - Available appointments did not meet patient need  --------------------------------------------------------------------------------------------------------------------------    Relationship to Patient: Self     Call Back Information: OK to leave message on voicemail  Preferred Call Back Number: [phone]

## 2025-06-11 ENCOUNTER — OFFICE VISIT (OUTPATIENT)
Age: 50
End: 2025-06-11
Payer: COMMERCIAL

## 2025-06-11 VITALS
DIASTOLIC BLOOD PRESSURE: 76 MMHG | HEART RATE: 62 BPM | SYSTOLIC BLOOD PRESSURE: 120 MMHG | WEIGHT: 140 LBS | HEIGHT: 64 IN | BODY MASS INDEX: 23.9 KG/M2 | OXYGEN SATURATION: 96 % | TEMPERATURE: 97.8 F

## 2025-06-11 DIAGNOSIS — G43.109 MIGRAINE WITH AURA AND WITHOUT STATUS MIGRAINOSUS, NOT INTRACTABLE: Primary | ICD-10-CM

## 2025-06-11 DIAGNOSIS — B00.9 HSV (HERPES SIMPLEX VIRUS) INFECTION: ICD-10-CM

## 2025-06-11 PROCEDURE — 3017F COLORECTAL CA SCREEN DOC REV: CPT | Performed by: NURSE PRACTITIONER

## 2025-06-11 PROCEDURE — G8427 DOCREV CUR MEDS BY ELIG CLIN: HCPCS | Performed by: NURSE PRACTITIONER

## 2025-06-11 PROCEDURE — 99214 OFFICE O/P EST MOD 30 MIN: CPT | Performed by: NURSE PRACTITIONER

## 2025-06-11 PROCEDURE — G8420 CALC BMI NORM PARAMETERS: HCPCS | Performed by: NURSE PRACTITIONER

## 2025-06-11 PROCEDURE — 1036F TOBACCO NON-USER: CPT | Performed by: NURSE PRACTITIONER

## 2025-06-11 RX ORDER — VALACYCLOVIR HYDROCHLORIDE 500 MG/1
TABLET, FILM COATED ORAL
Qty: 90 TABLET | Refills: 2 | Status: SHIPPED | OUTPATIENT
Start: 2025-06-11

## 2025-06-11 NOTE — PROGRESS NOTES
[facility]  [facility]  [address] [address]  Dept: [phone]  Dept Fax: [phone]  Loc: [phone]     Subjective  Evon Hogue, 50 y.o. female Established patient presents today with:  Chief Complaint   Patient presents with    3 Month Follow-Up       History of Present Illness  The patient presents for a follow-up visit.    She reports no new medical concerns or changes since her last visit. She is currently on a regimen of tirzepatide, which she obtains from [facility]. She has been on this medication since 2024 and has experienced significant weight loss, with her weight decreasing from 153 pounds in 2024 to 140 pounds currently. She also reports a reduction in the frequency of her migraines, having only experienced two episodes in the past nine months. Additionally, she notes an improvement in her heart condition, with no recent episodes reported.    She requests a refill of Valtrex.    She is on Imitrex for migraines and has had probably two migraines in the last nine months.    She is prescribed Atarax for anxiety but is not taking it. She has never been on any type of medication for anxiety.    She reports that she cannot afford an EpiPen right now.      Past Medical History:   Diagnosis Date    Anemia     Asthma     Migraines     Staph infection     -hx staph colonization     Past Surgical History:   Procedure Laterality Date    ABDOMEN SURGERY      mass removed     SECTION      COLONOSCOPY N/A 2024    COLORECTAL CANCER SCREENING, NOT HIGH RISK performed by Wilma Sheets MD at [facility]     Immunization History   Administered Date(s) Administered    COVID-19, PFIZER PURPLE top, DILUTE for use, (age 12 y+), 30mcg/0.3mL 2021, 2021, 2022     Current Outpatient Medications   Medication Sig Dispense  Pt c/o chest pain, jaw pain, right arm pain, and SOB that started at 0900 today  Took 325mg aspirin at home prior to arrival

## (undated) DEVICE — GLOVE ORANGE PI 8 1/2   MSG9085

## (undated) DEVICE — SINGLE PORT MANIFOLD: Brand: NEPTUNE 2

## (undated) DEVICE — Device: Brand: ENDO SMARTCAP

## (undated) DEVICE — BRUSH ENDO CLN L90.5IN SHTH DIA1.7MM BRIST DIA5-7MM 2-6MM

## (undated) DEVICE — TUBING, SUCTION, 1/4" X 10', STRAIGHT: Brand: MEDLINE

## (undated) DEVICE — TUBE SET 96 MM 64 MM H2O PERISTALTIC STD AUX CHANNEL

## (undated) DEVICE — ENDO CARRY-ON PROCEDURE KIT: Brand: ENDO CARRY-ON PROCEDURE KIT